# Patient Record
Sex: MALE | Race: BLACK OR AFRICAN AMERICAN | Employment: PART TIME | URBAN - METROPOLITAN AREA
[De-identification: names, ages, dates, MRNs, and addresses within clinical notes are randomized per-mention and may not be internally consistent; named-entity substitution may affect disease eponyms.]

---

## 2019-05-10 ENCOUNTER — PATIENT OUTREACH (OUTPATIENT)
Dept: CASE MANAGEMENT | Age: 54
End: 2019-05-10

## 2019-05-10 NOTE — PROGRESS NOTES
5/10/19 saw pt today with Dr. 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 He is currently a pt of Dr. Rakesh hCávez. Written information provided to the pt and this was also reviewed in the room. Provided opportunity to ask questions and all were discussed. My contact information was provided and I encouraged him to call with any questions or concerns. We still need to check eligibility with pathology report review and [employer] scan. The team will reach out to pt with updates and schedule. Navigation will continue to follow.

## 2019-06-03 PROBLEM — C7B.02 SECONDARY CARCINOID TUMORS OF LIVER (HCC): Status: ACTIVE | Noted: 2019-05-10

## 2019-06-03 PROBLEM — C7A.8 PRIMARY MALIGNANT NEUROENDOCRINE TUMOR OF PANCREAS (HCC): Status: ACTIVE | Noted: 2019-05-10

## 2019-06-03 PROBLEM — C7B.03: Status: ACTIVE | Noted: 2019-05-10

## 2019-07-16 ENCOUNTER — HOSPITAL ENCOUNTER (OUTPATIENT)
Dept: LAB | Age: 54
Discharge: HOME | End: 2019-07-16
Payer: MEDICARE

## 2019-07-16 DIAGNOSIS — C7B.03 SECONDARY CARCINOID TUMORS OF BONE (HCC): ICD-10-CM

## 2019-07-16 DIAGNOSIS — C7A.8 PRIMARY MALIGNANT NEUROENDOCRINE TUMOR OF PANCREAS (HCC): ICD-10-CM

## 2019-07-16 DIAGNOSIS — C7B.02 SECONDARY CARCINOID TUMORS OF LIVER (HCC): ICD-10-CM

## 2019-07-16 DIAGNOSIS — Z01.812 PRE-PROCEDURE LAB EXAM: ICD-10-CM

## 2019-07-16 LAB
ALBUMIN SERPL-MCNC: 3.2 G/DL (ref 3.5–5)
ALBUMIN/GLOB SERPL: 0.7 {RATIO} (ref 1.2–3.5)
ALP SERPL-CCNC: 399 U/L (ref 50–136)
ALT SERPL-CCNC: 15 U/L (ref 12–65)
ANION GAP SERPL CALC-SCNC: 12 MMOL/L (ref 7–16)
AST SERPL-CCNC: 13 U/L (ref 15–37)
BASOPHILS # BLD: 0 K/UL (ref 0–0.2)
BASOPHILS NFR BLD: 0 % (ref 0–2)
BILIRUB SERPL-MCNC: 0.3 MG/DL (ref 0.2–1.1)
BUN SERPL-MCNC: 37 MG/DL (ref 6–23)
CALCIUM SERPL-MCNC: 9.3 MG/DL (ref 8.3–10.4)
CHLORIDE SERPL-SCNC: 96 MMOL/L (ref 98–107)
CO2 SERPL-SCNC: 21 MMOL/L (ref 21–32)
CREAT SERPL-MCNC: 1.99 MG/DL (ref 0.8–1.5)
DIFFERENTIAL METHOD BLD: ABNORMAL
EOSINOPHIL # BLD: 0 K/UL (ref 0–0.8)
EOSINOPHIL NFR BLD: 0 % (ref 0.5–7.8)
ERYTHROCYTE [DISTWIDTH] IN BLOOD BY AUTOMATED COUNT: 15 % (ref 11.9–14.6)
GLOBULIN SER CALC-MCNC: 4.4 G/DL (ref 2.3–3.5)
GLUCOSE SERPL-MCNC: 408 MG/DL (ref 65–100)
HCT VFR BLD AUTO: 28 % (ref 41.1–50.3)
HGB BLD-MCNC: 9.3 G/DL (ref 13.6–17.2)
IMM GRANULOCYTES # BLD AUTO: 0 K/UL (ref 0–0.5)
IMM GRANULOCYTES NFR BLD AUTO: 0 % (ref 0–5)
LYMPHOCYTES # BLD: 0.2 K/UL (ref 0.5–4.6)
LYMPHOCYTES NFR BLD: 3 % (ref 13–44)
MCH RBC QN AUTO: 27 PG (ref 26.1–32.9)
MCHC RBC AUTO-ENTMCNC: 33.2 G/DL (ref 31.4–35)
MCV RBC AUTO: 81.4 FL (ref 79.6–97.8)
MONOCYTES # BLD: 0.2 K/UL (ref 0.1–1.3)
MONOCYTES NFR BLD: 3 % (ref 4–12)
NEUTS SEG # BLD: 6.8 K/UL (ref 1.7–8.2)
NEUTS SEG NFR BLD: 94 % (ref 43–78)
NRBC # BLD: 0 K/UL (ref 0–0.2)
PLATELET # BLD AUTO: 242 K/UL (ref 150–450)
PMV BLD AUTO: 9.2 FL (ref 9.4–12.3)
POTASSIUM SERPL-SCNC: 5.6 MMOL/L (ref 3.5–5.1)
PROT SERPL-MCNC: 7.6 G/DL (ref 6.3–8.2)
RBC # BLD AUTO: 3.44 M/UL (ref 4.23–5.67)
SODIUM SERPL-SCNC: 129 MMOL/L (ref 136–145)
WBC # BLD AUTO: 7.2 K/UL (ref 4.3–11.1)

## 2019-07-16 PROCEDURE — 80053 COMPREHEN METABOLIC PANEL: CPT

## 2019-07-16 PROCEDURE — 36415 COLL VENOUS BLD VENIPUNCTURE: CPT

## 2019-07-16 PROCEDURE — 86316 IMMUNOASSAY TUMOR OTHER: CPT

## 2019-07-16 PROCEDURE — 85025 COMPLETE CBC W/AUTO DIFF WBC: CPT

## 2019-07-17 LAB — CGA SERPL-SCNC: 13 NMOL/L (ref 0–5)

## 2019-07-18 ENCOUNTER — HOSPITAL ENCOUNTER (OUTPATIENT)
Dept: NUCLEAR MEDICINE | Age: 54
Discharge: HOME | End: 2019-07-18
Payer: MEDICARE

## 2019-07-18 ENCOUNTER — HOSPITAL ENCOUNTER (OUTPATIENT)
Dept: INFUSION THERAPY | Age: 54
Discharge: HOME | End: 2019-07-18
Payer: MEDICARE

## 2019-07-18 VITALS
DIASTOLIC BLOOD PRESSURE: 62 MMHG | RESPIRATION RATE: 16 BRPM | TEMPERATURE: 98.6 F | BODY MASS INDEX: 21.32 KG/M2 | WEIGHT: 140.2 LBS | OXYGEN SATURATION: 99 % | HEART RATE: 59 BPM | SYSTOLIC BLOOD PRESSURE: 118 MMHG

## 2019-07-18 DIAGNOSIS — C7B.03 SECONDARY CARCINOID TUMORS OF BONE (HCC): ICD-10-CM

## 2019-07-18 DIAGNOSIS — C7A.8 PRIMARY MALIGNANT NEUROENDOCRINE TUMOR OF PANCREAS (HCC): Primary | ICD-10-CM

## 2019-07-18 DIAGNOSIS — C7B.02 SECONDARY CARCINOID TUMORS OF LIVER (HCC): ICD-10-CM

## 2019-07-18 DIAGNOSIS — C7A.8 PRIMARY MALIGNANT NEUROENDOCRINE TUMOR OF PANCREAS (HCC): ICD-10-CM

## 2019-07-18 LAB
ALBUMIN SERPL-MCNC: 3.2 G/DL (ref 3.5–5)
ALBUMIN/GLOB SERPL: 0.8 {RATIO} (ref 1.2–3.5)
ALP SERPL-CCNC: 347 U/L (ref 50–136)
ALT SERPL-CCNC: 18 U/L (ref 12–65)
ANION GAP SERPL CALC-SCNC: 12 MMOL/L (ref 7–16)
AST SERPL-CCNC: 9 U/L (ref 15–37)
BILIRUB SERPL-MCNC: 0.3 MG/DL (ref 0.2–1.1)
BUN SERPL-MCNC: 30 MG/DL (ref 6–23)
CALCIUM SERPL-MCNC: 9 MG/DL (ref 8.3–10.4)
CHLORIDE SERPL-SCNC: 100 MMOL/L (ref 98–107)
CO2 SERPL-SCNC: 18 MMOL/L (ref 21–32)
CREAT SERPL-MCNC: 1.83 MG/DL (ref 0.8–1.5)
GLOBULIN SER CALC-MCNC: 4 G/DL (ref 2.3–3.5)
GLUCOSE SERPL-MCNC: 392 MG/DL (ref 65–100)
POTASSIUM SERPL-SCNC: 4.3 MMOL/L (ref 3.5–5.1)
PROT SERPL-MCNC: 7.2 G/DL (ref 6.3–8.2)
SODIUM SERPL-SCNC: 130 MMOL/L (ref 136–145)

## 2019-07-18 PROCEDURE — 96367 TX/PROPH/DG ADDL SEQ IV INF: CPT

## 2019-07-18 PROCEDURE — 79101 NUCLEAR RX IV ADMIN: CPT

## 2019-07-18 PROCEDURE — 96365 THER/PROPH/DIAG IV INF INIT: CPT

## 2019-07-18 PROCEDURE — 74011000258 HC RX REV CODE- 258: Performed by: INTERNAL MEDICINE

## 2019-07-18 PROCEDURE — 74011000258 HC RX REV CODE- 258

## 2019-07-18 PROCEDURE — 74011250636 HC RX REV CODE- 250/636: Performed by: INTERNAL MEDICINE

## 2019-07-18 PROCEDURE — 74011000250 HC RX REV CODE- 250: Performed by: INTERNAL MEDICINE

## 2019-07-18 PROCEDURE — A9513 LUTETIUM LU 177 DOTATAT THER: HCPCS | Performed by: INTERNAL MEDICINE

## 2019-07-18 PROCEDURE — 80053 COMPREHEN METABOLIC PANEL: CPT

## 2019-07-18 PROCEDURE — 96375 TX/PRO/DX INJ NEW DRUG ADDON: CPT

## 2019-07-18 PROCEDURE — 96366 THER/PROPH/DIAG IV INF ADDON: CPT

## 2019-07-18 RX ORDER — PALONOSETRON 0.05 MG/ML
0.25 INJECTION, SOLUTION INTRAVENOUS ONCE
Status: COMPLETED | OUTPATIENT
Start: 2019-07-18 | End: 2019-07-18

## 2019-07-18 RX ORDER — MAG HYDROX/ALUMINUM HYD/SIMETH 200-200-20
30 SUSPENSION, ORAL (FINAL DOSE FORM) ORAL
Status: DISPENSED | OUTPATIENT
Start: 2019-07-18 | End: 2019-07-18

## 2019-07-18 RX ORDER — SODIUM CHLORIDE 0.9 % (FLUSH) 0.9 %
10 SYRINGE (ML) INJECTION AS NEEDED
Status: ACTIVE | OUTPATIENT
Start: 2019-07-18 | End: 2019-07-18

## 2019-07-18 RX ORDER — DEXAMETHASONE SODIUM PHOSPHATE 100 MG/10ML
10 INJECTION INTRAMUSCULAR; INTRAVENOUS ONCE
Status: COMPLETED | OUTPATIENT
Start: 2019-07-18 | End: 2019-07-18

## 2019-07-18 RX ORDER — SODIUM CHLORIDE 9 MG/ML
25 INJECTION, SOLUTION INTRAVENOUS CONTINUOUS
Status: ACTIVE | OUTPATIENT
Start: 2019-07-18 | End: 2019-07-18

## 2019-07-18 RX ADMIN — Medication 10 ML: at 08:30

## 2019-07-18 RX ADMIN — PALONOSETRON 0.25 MG: 0.05 INJECTION, SOLUTION INTRAVENOUS at 09:40

## 2019-07-18 RX ADMIN — SODIUM CHLORIDE 25 ML/HR: 900 INJECTION, SOLUTION INTRAVENOUS at 10:45

## 2019-07-18 RX ADMIN — FAMOTIDINE 20 MG: 10 INJECTION, SOLUTION INTRAVENOUS at 09:28

## 2019-07-18 RX ADMIN — LUTETIUM LU 177 DOTATATE 200 MILLICURIE: 10 INJECTION INTRAVENOUS at 11:30

## 2019-07-18 RX ADMIN — DEXAMETHASONE SODIUM PHOSPHATE 10 MG: 10 INJECTION INTRAMUSCULAR; INTRAVENOUS at 09:37

## 2019-07-18 RX ADMIN — SODIUM CHLORIDE 150 MG: 900 INJECTION, SOLUTION INTRAVENOUS at 09:42

## 2019-07-18 RX ADMIN — Medication 10 ML: at 15:40

## 2019-07-18 RX ADMIN — ISOLEUCINE, LEUCINE, LYSINE ACETATE, METHIONINE, PHENYLALANINE, THREONINE, TRYPTOPHAN, VALINE, CYSTEINE HYDROCHLORIDE, HISTIDINE, TYROSINE, N-ACETYL-TYROSINE, ALANINE, ARGININE, PROLINE, SERINE, GLYCINE, ASPARTIC ACID, GLUTAMIC ACID, AND TAURINE 250 G
.82; 1.4; 1.2; .34; .48; .42; .2; .78; .024; .48; .044; .24; .54; 1.2; .68; .38; .36; .32; .5; .025 SOLUTION INTRAVENOUS at 10:40

## 2019-07-18 RX ADMIN — SODIUM CHLORIDE 1000 ML: 900 INJECTION, SOLUTION INTRAVENOUS at 08:40

## 2019-07-18 NOTE — PROGRESS NOTES
Trophamine complete,voiding frequently,  Denies n/v.  Nuc. Med in to scan room.  Room cleared   Discharge instructions reviewed with patient and girlfriend by Nas Acevedo RN  Discharged home, Next appt w/ Dr Antwan Read 7/19  With Dr Giselle Ashraf 8/19

## 2019-07-18 NOTE — PROGRESS NOTES
Arrived to infusion for D1 Magdya  Reviewed procedure , questions answered. CMP redrawn , Cr 1.8, K + 4.2 , results called to Vega Lind RN. OK to proceed per Dr Remy\  CrCL 44  NS bolus infused, all premeds given. Trophamine started 300 min prior to [address] [facility], [id number].  Tolerating well

## 2019-07-21 ENCOUNTER — PATIENT OUTREACH (OUTPATIENT)
Dept: CASE MANAGEMENT | Age: 54
End: 2019-07-21

## 2019-07-22 NOTE — PROGRESS NOTES
Reason for Referral: Malignant carcinoid tumors of other sites     Referring Provider: Antonia Fernández MD     Primary Care Provider: Aruna Lim MD     Family History of Cancer/Hematologic Disorders:  None reported     Presenting Symptoms: Severe back pain, elevated chromogranin A, and abnormal CT scans/MRI       Narrative with recent with Results/Procedures/Biopsies and Dates completed:  Mr. Kraig Mayer is a 48year old black male with a history of HTN, DM2, bradycardia, gout, insomnia, anal fissurectomy in 2011, gastrojejunostomy, and jejunostomy feeding tube placement in 2012. He was generally healthy until developing abdominal discomfort and anorexia in the spring and summer of 2010. He initially had symptomatic treatment for what was thought to be a gastric ulcer. However, symptoms worsened, and CT in the ER identified an abdominal mass. On 2/14/2011 the abdominal mass was biopsied revealing a neuroendocrine carcinoma. His chromogranin A was elevated at 26, and his symptoms included a weight loss of 80 pounds. 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 He established care with Dr. Ann-Marie Sheets at [facility] on 3/18/11, and was initiated on Everolimus plus Beviciumab on CALGB 79046, Arm B (which included Sandostatin  LAR) with good response to therapy. In April of 2012, disease progressed with liver metastasis, and patient was switched to cisplatin and etoposide with excellent response documented on CT scan after 2 cycles. By August of 2012, patient had completed 6 cycles of cisplatin and etoposide.      In October of 2012, patient was started on 37.5 mg Sunitinib daily, with excellent disease control as evidenced by CT scan in July of 2013. Sunitinib 37.5 mg daily was continued, and patient continued to do well with excellent disease control by history, physical exam, and CT scan in October of 2013.  It was not until March of 2014 that patient reported new symptoms of worsening abdominal pain and significant weight loss. CT scan of the abdomen and chromogranin A were reordered (chromogranin A was 88 on 11/11/2013; 112 on 12/11/2013; and 102 on 01/06/2014). CT scan in April of 2014 showed progression of disease with increased size of abdominal masses, and labs revealed a rise in chromogranin A from 100 to 150. Patient was subsequently started on Capecitabine 2500 mg daily in divided doses days 1 through 14, Temozolomide 400 mg daily in divided doses days 10 through 14, and Granisetron 2 mg daily days 10 through 14 of a 28-day cycle. On 2/3/15, Capecitabine was discontinued and patient remained only on Temozolamide.      In December of 2015, patient continued to do well on Temozolomide taken days #10 through #14 each month with no signs or symptoms of progression. He continued with the same regimen and monthly Sandostatin. MD noted that patient felt so well that he stopped taking his Temozolomide for 2 months in the summer of 2016; however, despite this he showed no signs of progression and the April 2016 CT scan looked better than the August 2015 scan. He resumed taking his Temozolomide on 7/25/16.     He continued doing well with no signs of disease progression until January of 2018 when he reported anorexia and unintentional weight loss. Restaging scans on 1/24/18, however, were stable. A1C was 11.8 and weight loss was thought to be due to poor diabetes control. In June of 2018, he began experiencing worsening right abdominal pain and CT scan of the chest/abd/pelvis was ordered with 3 phase pancreatic protocol for evaluation of pain and restaging.  CT scan on 6/20/18 demonstrated a stable appearance of dominant retroperitoneal mass associated with the tail the pancreas with occlusion of the splenic vein with extensive perigastric collaterals; however, question early infiltration of the contiguous posteromedial margin of the spleen which may have been present on the 01/24/2018 scan but was not evident on the 11/28/2017 scan; no new evidence of pulmonary or hepatic metastatic disease; and a stable appearance of the pelvis.      Patient continued on Sandostatin and Temozolomide with Norco given for abdominal pain. CT scans of the chest/abdomen/pelvis on 1/10/19 were stable, however, patient became increasingly symptomatic with severe back pain. Temozolomide was stopped on 1/16/19 and interventional pain management and radiation oncology were consulted.      CT scans stable abdomen tumor on Temozolomide alone, but he is increasingly symptomatic with severe back pain, the worst that he has had in 9 years. Stop the Temozolomide. Consult interventional pain management. Consult radiation oncology. Set up a CT scan guided biopsy to send tissue for [facility]. Consider a new line of systemic therapy or even going back to cis-platinum/etoposide. On 1/28/19, patient underwent CT guided core biopsy of the retroperitoneal mass and tissue was sent for [facility]. By the time of his radiation therapy consultation on 1/30/19, patients pain had resolved and he did not require radiation therapy. On 1/31/19, Temozolomide was restarted.      On 4/1/19, patient underwent MRI of the lumbar spine with and without contrast that unfortunately identified widespread osseous metastatic disease, with metastases in the every vertebral segment. Chromogranin A, drawn on 4/2/19, was elevated to 241. CT of the chest, abdomen and pelvis on 4/16/19 revealed interval development of multifocal osseous metastases and liver metastases. The primary retroperitoneal mass was seen with a fairly similar configuration and dimension, again demonstrating encasement of multiple arteries (celiac and superior mesenteric and renal).     Sandostatin and Temozolomide were continued and patient was referred to [facility] for evaluation for Lutethera trial. Prior to initiation of Cheryl Julian,  Jose Sam referred patient again to radiation oncology for palliative radiation. Radiation oncologist, Dr. Gamal Dsouza, saw patient on 5/6/19 and agreed to a rapid course of 2000 cGy in 5 fractions over 1 week with a CT simulation scheduled for 5/8/2019.     PATHOLOGY EXAMINATION 2/10/2011       PATHOLOGY EXAMINATION 2/14/2011         PET CT 2/28/2011       BONE SCAN 3/2/2011  FINDINGS:        OCTREOTIDE SCAN 3/7/2011  FINDINGS:        CT OF THE CHEST, ABDOMEN AND PELVIS 6/17/2011           CT ABDOMEN AND PELVIS 7/21/11         CHEST, ABDOMEN, AND PELVIS CT WITH CONTRAST 9/9/2011         CT THORAX WITH CONTRAST 12/14/2011       CT THORAX W/CONTRAST 2/23/2012           CT OF THE CHEST, ABDOMEN AND PELVIS WITH ORAL AND IV CONTRAST 4/19/12           CT ABDOMEN AND CT PELVIS WITH ORAL AND IV CONTRAST 6/4/2012  FINDINGS:          CT CHEST, CT ABDOMEN, CT PELVIS WITH ORAL AND IV CONTRAST 8/15/12             CT ABDOMEN, CT PELVIS WITH ORAL AND IV CONTRAST 11/8/2012  FINDINGS:          CT ABDOMEN AND PELVIS 3/6/2013  FINDINGS:          PANCREATIC PROTOCOL CT ABDOMEN 5/30/2013         CT OF THE ABDOMEN 10/11/2013  FINDINGS:          CT ABD & PELVIS W/CONTRAST 4/30/14       CT ABD & PELVIS W/CONTRAST 10/16/2014          CT OF THE ABDOMEN AND PELVIS 8/21/2015  FINDINGS: The lung bases are unremarkable. The liver is normal in size, and is normal in attenuation. The gallbladder is unremarkable. There is no evidence of dilated ducts. The spleen is normal in size. I believe there are several areas of subtle low attenuation involving the periphery of the spleen in addition there is a more central hypoechoic area which measures approximately 13 mm maximal diameter. While the exact etiology of these is uncertain, strictly speaking, I would not be of exclude metastatic disease to the spleen.  The pancreas is not well delineated. There is abnormal soft tissue density in the expected location of the distal body/tail of the pancreas. This poorly defined soft tissue density measures approximately 5.7 cm x 4.2 cm, therefore may be slightly larger than was seen on the prior study there is loss of the normal fat planes between this mass and the proximal SMA and celiac axis. There are numerous portosystemic collaterals noted. The splenic vein is not visualized in its entirety. There may well be a occlusion of the splenic vein. The abdominal aorta is normal incaliber. No adrenal mass is identified. The kidneys unremarkable. I believe the patient has had prior bowel surgery. It appears AS findings consistent with that of a gastrojejunostomy. Images of the pelvis show no evidence of a pelvic mass. There is no evidence of ascites. IMPRESSION:                                                             1. I SEE LITTLE INTERVAL CHANGE IN THE APPEARANCE OF THE PATIENT'S   KNOWN RETROPERITONEAL MASS. WHEN COMPARED WITH PRIOR [address] IT IS  DIFFICULT TO GIVE AN EXACT MEASUREMENTS, THE PATIENT'S KNOWN MASS MAY   HAVE INCREASED SLIGHTLY IN SIZE WHEN COMPARED WITH PRIOR STUDY. 2. 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 3. THERE ARE SEVERAL AREAS OF ABNORMAL LOW ATTENUATION NOTED IN THE PERIPHERY OF THE SPLEEN AS WELL AS A SMALLER MORE CENTRAL 13 MM LESION IN THE CENTRAL ASPECT OF THE SPLEEN. THE EXACT ETIOLOGY OF THESE FINDINGS IS UNCERTAIN. ONE WOULD NOT, HOWEVER, BE ABLE TO EXCLUDE THE POSSIBILITY OF METASTATIC DISEASE                                               CT ABDOMEN PELVIS W WO CONTRAST 4/19/2016  ABDOMINAL CT SCAN FINDINGS: A 49 mm x 46 mm mass is present in the upper left retroperitoneum (sequence 701, image 34) previously measuring 57 mm x 43 mm. The mass demonstrates stippled dystrophic central calcification and demonstrates cicatrization and retraction of the adjacent retroperitoneal fat.  The mass is infiltrating the adipose tissue about the left hemidiaphragm crura. The mass encases the splenic artery and the common hepatic artery. It has resulted in occlusion of the splenic vein, superior mesenteric vein and portal vein confluence. Multiple dilated portal venous collaterals are demonstrated reconstituting the portal vein in the swati hepatis. Overall the mass is grossly stable if not smaller (exact measurements are difficult given the infiltrating nature of the mass). The liver, gallbladder and imaged pancreas remain normal. The adrenals and kidneys are normal. The spleen is normal. Prior gastric bypass, there is no evidence of bowel obstruction. The lung bases are clear. There is no pneumoperitoneum or evidence of bowel obstruction  PELVIC CT SCAN FINDINGS: The bladder is moderately distended. There is no free fluid in the pelvis. There is no evidence of bowel obstruction. IMPRESSION:  1. Persistent mass in the left retroperitoneum stable since August 2015, possibly slightly smaller. 2.  The mass demonstrates infiltrating margins into the retroperitoneal fat and has resulted in encasement of the splenic artery and proximal common hepatic artery as well as occlusion of the portal vein confluence with the superior mesenteric vein and splenic vein. 3. Currently, no evidence of hepatic, splenic or adrenal metastatic disease.     CT ABDOMEN PELVIS W WO CONTRAST 2/28/2017  ABDOMINAL CT SCAN FINDINGS: Again demonstrated in the retroperitoneum is a infiltrating soft tissue mass measuring approximately 48 mm x 45 mm (Grossly unchanged). There is stippled calcification within the central mass. Again demonstrated is infiltration of the retroperitoneal soft tissues secondary the tumor and there is vascular encasement of the splenic artery, branches of the superior mesenteric artery and occlusion of the splenic vein. There is a somewhat stellate appearance to the mass with mesenteric retraction.  The splenic vein and proximal superior mesenteric vein are obstructed with numerous portal venous collaterals recanalized the portal venous flow in the upper abdomen. No hepatic metastatic lesions are demonstrated. The left adrenal is engulfed by the mass. Right adrenal is grossly normal. The pancreas is obscured by the mass. The spleen and kidneys are normal. The lung bases are clear. There is no evidence of bowel obstruction. PELVIC CT SCAN FINDINGS:  The GI tract in the pelvis is normal. There is no pathologic adenopathy or free fluid. IMPRESSION:  1. The central carcinoid mass in the left retroperitoneum is grossly stable in size but there is continued cicatrization and retraction of the mesentery and soft tissues at the level of the mass. Marked paucity of retroperitoneal fat makes demonstration of the mass margins less distinct. 2.  Vascular encasement of the splenic artery and branches superior mesenteric artery. 3.  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     CT ABDOMEN PELVIS W CONTRAST 11/28/2017  FINDINGS: A tiny subpleural density of the right lower lobe seen on image 17 is unchanged. The lung base parenchyma remains clear. The partially calcified retroperitoneal mass is seen as before.  Subjectively the mass appears a little larger. Attempting to measure at the same level and in the same manner as previous I achieve biaxial dimensions of about 56 x 54 mm which compares to previous measurements of 40 x 45 mm. As before there is encasement of branches of the splenic and superior mesenteric arteries. Note is also again made of splenic vein occlusion with numerous portasystemic collaterals in the upper abdomen. As before orally the pancreatic head is confidently identify with inhomogeneous enhancement. The mass presumably involves the left adrenal gland as well. The right adrenal gland appears within normal limits. There are no new liver lesions evident. The spleen enhances homogeneously.  The kidneys enhance symmetrically and normally. No unusual bowel loops are evident on today's exam. I do not appreciate any new inflammatory changes. There are no new free or focal fluid collections evident. There is no pathologic lymphadenopathy or new suspicious soft tissue lesion. The prostate gland and unopacified urinary bladder are grossly unremarkable in appearance. Finally, there are no focal bone lesions. IMPRESSION:  1.  THE RETROPERITONEAL MASS APPEARS SLIGHTLY LARGER THAN BEFORE.  HOWEVER, THERE ARE NO OTHER INTERVAL CHANGES EVIDENT.     CHEST/ABDOMEN/PELVIS CT SCAN WITH CONTRAST 1/24/2018  CHEST CT FINDINGS: The mediastinum and mediastinal structures remain normal. The lungs are clear without metastatic disease. Several very small micronodules are noted, these will likely ultimately be benign intrapulmonary lymph nodes.  The soft tissues a chest are normal       ABDOMEN CT FINDINGS: Partially calcified left upper retroperitoneal mass remains present measuring approximately 6 cm x 5.4 cm (previously measuring 5.6 cm x 5.4 cm. There are regions of necrosis as well as dystrophic calcifications. The mass is seen to occlude the splenic vein and there are numerous upper abdominal portal venous collaterals reconstituting splenic venous flow to the portal vein. The liver remains normal without metastatic lesion. The gallbladder and biliary tree are normal. The spleen is normal. The right adrenal is normal. The left adrenal is engulfed by the mass. Both kidneys are grossly normal. The GI tract demonstrates no evidence of bowel obstruction. The stomach is compressed by the mass. Multiple gastric venous channels are also open reconstitute antegrade splenic vein flow. There is no ascites. There is induration of the millicent-vascular structures in the upper retroperitoneum similar to the prior examination. However, no arterial occlusions are present.  Prior primary bowel reanastomosis in the upper abdomen  PELVIS CT FINDINGS: The pelvis is normal.  RESULTS:   1. Large left retroperitoneal mass slightly enlarged interval but otherwise unchanged  2. Persistent splenic vein occlusion with numerous well developed collaterals reconstituting antegrade splenic flow to the main portal vein  3. No distant metastatic disease.     CHEST/ABDOMEN/PELVIS CT SCAN WITH CONTRAST 6/20/18  FINDINGS:  THORACIC CT: Lungs remain clear. No evidence of pulmonary metastases or infiltrates. No new mediastinal or hilar adenopathy. No pleural or pericardial effusion. No new chest wall lesions/osseous abnormalities. ABDOMINAL CT:  LIVER: No evidence of hepatic metastases. Gallbladder is normal in appearance. No new biliary dilatation. SPLEEN: Decreased attenuation at the point of contact with the pancreatic mass, potentially reflecting localized invasion but stable since 01/24/2018. Evidence of splenic vein occlusion with extensive perigastric collaterals and multiple collaterals extending into the swati hepatis. Distortion but not occlusion of the splenic artery. PANCREAS: Persistent mixed attenuation mass in the retroperitoneum around the region of the tail the pancreas measuring at least 5.9 x 5.4 cm using the same landmarks that were utilized previously. No significant change in the interval.  KIDNEYS: Symmetric enhancement without obstruction. No distended large or small bowel loops. PELVIC CT: No evidence of colon wall thickening or pericolonic inflammatory changes. No evidence of bowel obstruction. No new pelvic adenopathy or ascites. No new pelvic bone lesions. Likely etiology of patient's left lower quadrant pain not identified.   IMPRESSION:   1. Stable appearance of dominant retroperitoneal mass associated with the tail the pancreas with occlusion of the splenic vein with extensive perigastric collaterals.    2. However, question early infiltration of the contiguous posteromedial margin of the spleen. This may have been present on 01/24/2080 but was not evident 11/28/2017.  3. No new evidence of pulmonary or hepatic metastatic disease. 4. Stable appearance of pelvis.     CHEST CT 01/10/2019  FINDINGS: Lungs appear clear. No pulmonary nodules are detected. There is no mediastinal lymphadenopathy. Coronary artery calcifications are present. Chest wall demonstrates increased breast soft tissue density suggesting gynecomastia. There is extensive portasystemic collaterals in the upper abdomen. Mass of the pancreas is evaluated on abdominal CT with separate report. IMPRESSION: NO EVIDENCE OF METASTATIC DISEASE IN THE CHEST. THERE IS EVIDENCE OF CORONARY ARTERY CALCIFICATIONS.     CT ABDOMEN PELVIS PANCREAS PROTOCOL WITHOUT AND WITH IV CONTRAST 01/10/2019  FINDINGS: Examination redemonstrates extensive upper abdominal portal systemic collateral vessels (perigastric collaterals, periportal and gastrohepatic ligament and esophageal varices). High-grade narrowing superior mesenteric vein and occlusion splenic vein are again demonstrated. The poorly defined heterogeneous mass in the left retroperitoneal measures approximately 5.1 x 6.6 cm (5.4 x 5.9 cm previously). Visually, this does not appear obviously changed. Liver appears normal. Spleen demonstrates stripes of diminished attenuation/enhancement which are thought to relate to splenic architecture and contrast timing and not pathology. The gallbladder appears normal. No biliary ductal dilatation. Left adrenal is not visible and is thought to be involved by the mass. Right adrenal appears normal. Kidneys appear normal. Pelvis demonstrates no free fluid or evidence of adenopathy. Prostate is not enlarged.  Urinary bladder appears normal.  IMPRESSION: FINDINGS AS ABOVE WITH NO MAJOR CHANGES COMPARED TO PREVIOUS EXAM.     PATHOLOGY EXAMINATION 1/28/2019       MRI LUMBAR SPINE W WO CONTRAST 4/1/2019  FINDINGS:  Comparison:  CT chest abdomen and pelvis 01/10/2019   PROBLEM SPECIFIC FINDINGS:   - widespread osseous malignancy is identified, with T1 hypointense/STIR hyperintense, heterogeneously but aggressively enhancing metastases are seen involving virtually every vertebral segment on this exam.  - I see no definite evidence for extraosseous extension of tumor, nor do I see any pathologic compression fracture at this time. ADDITIONAL FINDINGS: Accelerate degenerative disc disease is seen at L5-S1 manifest as loss of normal water signal and intervertebral disc space height with endplate changes and marginal osteophytes. Facet relationships are normally maintained. T12-L1: No significant abnormality. L1-L2: No significant abnormality. L2-L3: No significant abnormality. L3-L4: No significant abnormality. L4-L5: Mild intraforaminal bulging of the disc annulus without neural displacement  L5-S1: Mild posterior and intraforaminal bulging of the disc annulus without neural displacement  I see no signal nor enhancing abnormalities in the visualized lower conus medullaris. IMPRESSION:   1. Widespread osseous metastatic disease, with metastases in the every vertebral segment. 2. I see no spinal stenosis nor definite neural displacement. 3. These findings were verbally communicated (10:53) Zoltan Preston: called to [facility], for Dr. Sophia Cho at 10:48 a.m.     CT CHEST ABDOMEN PELVIS W CONTRAST 4/16/2019  FINDINGS:   CHEST: The mediastinum shows no adenopathy in the heart size and pericardial thickness are normal. Coronary calcified plaque is less conspicuous on today's exam which has generous contrast bifurcation of the arteries. I find no evidence of hilar adenopathy on either side. No pleural abnormalities have developed. A review of lung window settings on the MIP images shows no opacity in either lung to suggest metastatic lesion.  Bone window settings show a new zone of hyperdensity in the left hemivertebra at T3 measuring 15 mm and another in the right hemivertebra at T5 measuring 17 mm posterior elements and no hyperdense at T7 and there is a dense lesion in the left hemivertebra of T8 measuring 14 mm. The right transverse process and lamina at T9 shows worsened density. All of the lumbar vertebral segments show zones of new or worsened density abnormality. Scattered zones of dense abnormality are more conspicuous in the iliac bones and sacrum and right more than left pubic bones. ABDOMEN: New zones of density abnormality demonstrated in both right and left lobes of the liver including some right under the dome of the diaphragm. Many of these have ring enhancing appearance. The largest appears to be in the right lobe which on image 92 of sequence 201 measures 21 x 33 mm. The spleen again shows band-like enhancement, more likely related to bolus injection and acquisition timing than pathology. Upper abdominal mesenteric soft tissues show no pathologic bowel distention or fluid collection. Retroperitoneal/periaortic mass is again demonstrated including some calcifications. In January the dimensions were 51 x 66 mm. Today I measure on image 95 of sequence 201 and get dimensions of 59 x 61 mm. Side by side comparison shows fairly stable configuration and dimension on adjacent slices. Today's study has more dense arterial enhancement and shows to better advantage encasement of the celiac and superior mesenteric trunks as well as both renal arteries. The left adrenal gland is again difficult to identify and may be incorporated into the lesion. The right is not changed. I see no focal renal lesion or urinary tract obstruction on either side. No new retroperitoneal adenopathy is demonstrated. PELVIS: I do not see any iliac or inguinal adenopathy on either side. There is no pathologic bowel distention or fluid collection. The prostate has stable appearance in the urinary bladder is unremarkable. IMPRESSION: Interval study showing development of multifocal osseous metastases and liver metastases.  The primary retroperitoneal mass has fairly similar configuration and dimension, again demonstrating encasement of multiple arteries (celiac and superior mesenteric and renal).                             NotReason for Referral: Malignant carcinoid tumors of other sites     Referring Provider: Judi Mckinney MD     Primary Care Provider: Lynda Brooke MD     Family History of Cancer/Hematologic Disorders:  None reported     Presenting Symptoms: Severe back pain, elevated chromogranin A, and abnormal CT scans/MRI       Narrative with recent with Results/Procedures/Biopsies and Dates completed:  Mr. Maye Stewart is a 48year old black male with a history of HTN, DM2, bradycardia, gout, insomnia, anal fissurectomy in 2011, gastrojejunostomy, and jejunostomy feeding tube placement in 2012. He was generally healthy until developing abdominal discomfort and anorexia in the spring and summer of 2010. He initially had symptomatic treatment for what was thought to be a gastric ulcer. However, symptoms worsened, and CT in the ER identified an abdominal mass. On 2/14/2011 the abdominal mass was biopsied revealing a neuroendocrine carcinoma. His chromogranin A was elevated at 26, and his symptoms included a weight loss of 80 pounds. 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 He established care with Dr. Flori Boone at [facility] on 3/18/11, and was initiated on Everolimus plus Beviciumab on CALGB 79408, Arm B (which included Sandostatin  LAR) with good response to therapy. In April of 2012, disease progressed with liver metastasis, and patient was switched to cisplatin and etoposide with excellent response documented on CT scan after 2 cycles. By August of 2012, patient had completed 6 cycles of cisplatin and etoposide.      In October of 2012, patient was started on 37.5 mg Sunitinib daily, with excellent disease control as evidenced by CT scan in July of 2013.  Sunitinib 37.5 mg daily was continued, and patient continued to do well with excellent disease control by history, physical exam, and CT scan in October of 2013. It was not until March of 2014 that patient reported new symptoms of worsening abdominal pain and significant weight loss. CT scan of the abdomen and chromogranin A were reordered (chromogranin A was 88 on 11/11/2013; 112 on 12/11/2013; and 102 on 01/06/2014). CT scan in April of 2014 showed progression of disease with increased size of abdominal masses, and labs revealed a rise in chromogranin A from 100 to 150. Patient was subsequently started on Capecitabine 2500 mg daily in divided doses days 1 through 14, Temozolomide 400 mg daily in divided doses days 10 through 14, and Granisetron 2 mg daily days 10 through 14 of a 28-day cycle. On 2/3/15, Capecitabine was discontinued and patient remained only on Temozolamide.      In December of 2015, patient continued to do well on Temozolomide taken days #10 through #14 each month with no signs or symptoms of progression. He continued with the same regimen and monthly Sandostatin. MD noted that patient felt so well that he stopped taking his Temozolomide for 2 months in the summer of 2016; however, despite this he showed no signs of progression and the April 2016 CT scan looked better than the August 2015 scan. He resumed taking his Temozolomide on 7/25/16.     He continued doing well with no signs of disease progression until January of 2018 when he reported anorexia and unintentional weight loss. Restaging scans on 1/24/18, however, were stable. A1C was 11.8 and weight loss was thought to be due to poor diabetes control. In June of 2018, he began experiencing worsening right abdominal pain and CT scan of the chest/abd/pelvis was ordered with 3 phase pancreatic protocol for evaluation of pain and restaging.  CT scan on 6/20/18 demonstrated a stable appearance of dominant retroperitoneal mass associated with the tail the pancreas with occlusion of the splenic vein with extensive perigastric collaterals; however, question early infiltration of the contiguous posteromedial margin of the spleen which may have been present on the 01/24/2018 scan but was not evident on the 11/28/2017 scan; no new evidence of pulmonary or hepatic metastatic disease; and a stable appearance of the pelvis.      Patient continued on Sandostatin and Temozolomide with Norco given for abdominal pain. CT scans of the chest/abdomen/pelvis on 1/10/19 were stable, however, patient became increasingly symptomatic with severe back pain. Temozolomide was stopped on 1/16/19 and interventional pain management and radiation oncology were consulted.      CT scans stable abdomen tumor on Temozolomide alone, but he is increasingly symptomatic with severe back pain, the worst that he has had in 9 years. Stop the Temozolomide. Consult interventional pain management. Consult radiation oncology. Set up a CT scan guided biopsy to send tissue for [facility]. Consider a new line of systemic therapy or even going back to cis-platinum/etoposide. On 1/28/19, patient underwent CT guided core biopsy of the retroperitoneal mass and tissue was sent for [facility]. By the time of his radiation therapy consultation on 1/30/19, patients pain had resolved and he did not require radiation therapy. On 1/31/19, Temozolomide was restarted.      On 4/1/19, patient underwent MRI of the lumbar spine with and without contrast that unfortunately identified widespread osseous metastatic disease, with metastases in the every vertebral segment. Chromogranin A, drawn on 4/2/19, was elevated to 241. CT of the chest, abdomen and pelvis on 4/16/19 revealed interval development of multifocal osseous metastases and liver metastases.  The primary retroperitoneal mass was seen with a fairly similar configuration and dimension, again demonstrating encasement of multiple arteries (celiac and superior mesenteric and renal). Sandostatin and Temozolomide were continued and patient was referred to [facility] for evaluation for Lutethera trial. Prior to initiation of Vinie Arrington, Dr. Jerri Moss referred patient again to radiation oncology for palliative radiation. Radiation oncologist, Dr. Colonel Shah, saw patient on 5/6/19 and agreed to a rapid course of 2000 cGy in 5 fractions over 1 week with a CT simulation scheduled for 5/8/2019.     PATHOLOGY EXAMINATION 2/10/2011       PATHOLOGY EXAMINATION 2/14/2011         PET CT 2/28/2011       BONE SCAN 3/2/2011  FINDINGS:        OCTREOTIDE SCAN 3/7/2011  FINDINGS:        CT OF THE CHEST, ABDOMEN AND PELVIS 6/17/2011           CT ABDOMEN AND PELVIS 7/21/11         CHEST, ABDOMEN, AND PELVIS CT WITH CONTRAST 9/9/2011         CT THORAX WITH CONTRAST 12/14/2011       CT THORAX W/CONTRAST 2/23/2012           CT OF THE CHEST, ABDOMEN AND PELVIS WITH ORAL AND IV CONTRAST 4/19/12           CT ABDOMEN AND CT PELVIS WITH ORAL AND IV CONTRAST 6/4/2012  FINDINGS:          CT CHEST, CT ABDOMEN, CT PELVIS WITH ORAL AND IV CONTRAST 8/15/12             CT ABDOMEN, CT PELVIS WITH ORAL AND IV CONTRAST 11/8/2012  FINDINGS:          CT ABDOMEN AND PELVIS 3/6/2013  FINDINGS:          PANCREATIC PROTOCOL CT ABDOMEN 5/30/2013         CT OF THE ABDOMEN 10/11/2013  FINDINGS:          CT ABD & PELVIS W/CONTRAST 4/30/14       CT ABD & PELVIS W/CONTRAST 10/16/2014          CT OF THE ABDOMEN AND PELVIS 8/21/2015  FINDINGS: The lung bases are unremarkable.  The liver is normal in size, and is normal in attenuation. The gallbladder is unremarkable. There is no evidence of dilated ducts. The spleen is normal in size. I believe there are several areas of subtle low attenuation involving the periphery of the spleen in addition there is a more central hypoechoic area which measures approximately 13 mm maximal diameter. While the exact etiology of these is uncertain, strictly speaking, I would not be of exclude metastatic disease to the spleen. The pancreas is not well delineated. There is abnormal soft tissue density in the expected location of the distal body/tail of the pancreas. This poorly defined soft tissue density measures approximately 5.7 cm x 4.2 cm, therefore may be slightly larger than was seen on the prior study there is loss of the normal fat planes between this mass and the proximal SMA and celiac axis. There are numerous portosystemic collaterals noted. The splenic vein is not visualized in its entirety. There may well be a occlusion of the splenic vein. The abdominal aorta is normal incaliber. No adrenal mass is identified. The kidneys unremarkable. I believe the patient has had prior bowel surgery. It appears AS findings consistent with that of a gastrojejunostomy. Images of the pelvis show no evidence of a pelvic mass. There is no evidence of ascites. IMPRESSION:                                                             1. I SEE LITTLE INTERVAL CHANGE IN THE APPEARANCE OF THE PATIENT'S   KNOWN RETROPERITONEAL MASS. WHEN COMPARED WITH PRIOR [address] IT IS  DIFFICULT TO GIVE AN EXACT MEASUREMENTS, THE PATIENT'S KNOWN MASS MAY   HAVE INCREASED SLIGHTLY IN SIZE WHEN COMPARED WITH PRIOR STUDY. 2. 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 3. THERE ARE SEVERAL AREAS OF ABNORMAL LOW ATTENUATION NOTED IN THE PERIPHERY OF THE SPLEEN AS WELL AS A SMALLER MORE CENTRAL 13 MM LESION IN THE CENTRAL ASPECT OF THE SPLEEN. THE EXACT ETIOLOGY OF THESE FINDINGS IS UNCERTAIN. ONE WOULD NOT, HOWEVER, BE ABLE TO EXCLUDE THE POSSIBILITY OF METASTATIC DISEASE                                               CT ABDOMEN PELVIS W WO CONTRAST 4/19/2016  ABDOMINAL CT SCAN FINDINGS: A 49 mm x 46 mm mass is present in the upper left retroperitoneum (sequence 701, image 34) previously measuring 57 mm x 43 mm. The mass demonstrates stippled dystrophic central calcification and demonstrates cicatrization and retraction of the adjacent retroperitoneal fat. The mass is infiltrating the adipose tissue about the left hemidiaphragm crura. The mass encases the splenic artery and the common hepatic artery. It has resulted in occlusion of the splenic vein, superior mesenteric vein and portal vein confluence. Multiple dilated portal venous collaterals are demonstrated reconstituting the portal vein in the swati hepatis. Overall the mass is grossly stable if not smaller (exact measurements are difficult given the infiltrating nature of the mass). The liver, gallbladder and imaged pancreas remain normal. The adrenals and kidneys are normal. The spleen is normal. Prior gastric bypass, there is no evidence of bowel obstruction. The lung bases are clear. There is no pneumoperitoneum or evidence of bowel obstruction  PELVIC CT SCAN FINDINGS: The bladder is moderately distended. There is no free fluid in the pelvis. There is no evidence of bowel obstruction. IMPRESSION:  1. Persistent mass in the left retroperitoneum stable since August 2015, possibly slightly smaller. 2.  The mass demonstrates infiltrating margins into the retroperitoneal fat and has resulted in encasement of the splenic artery and proximal common hepatic artery as well as occlusion of the portal vein confluence with the superior mesenteric vein and splenic vein. 3. Currently, no evidence of hepatic, splenic or adrenal metastatic disease.     CT ABDOMEN PELVIS W WO CONTRAST 2/28/2017  ABDOMINAL CT SCAN FINDINGS: Again demonstrated in the retroperitoneum is a infiltrating soft tissue mass measuring approximately 48 mm x 45 mm (Grossly unchanged). There is stippled calcification within the central mass.  Again demonstrated is infiltration of the retroperitoneal soft tissues secondary the tumor and there is vascular encasement of the splenic artery, branches of the superior mesenteric artery and occlusion of the splenic vein. There is a somewhat stellate appearance to the mass with mesenteric retraction. The splenic vein and proximal superior mesenteric vein are obstructed with numerous portal venous collaterals recanalized the portal venous flow in the upper abdomen. No hepatic metastatic lesions are demonstrated. The left adrenal is engulfed by the mass. Right adrenal is grossly normal. The pancreas is obscured by the mass. The spleen and kidneys are normal. The lung bases are clear. There is no evidence of bowel obstruction. PELVIC CT SCAN FINDINGS:  The GI tract in the pelvis is normal. There is no pathologic adenopathy or free fluid. IMPRESSION:  1. The central carcinoid mass in the left retroperitoneum is grossly stable in size but there is continued cicatrization and retraction of the mesentery and soft tissues at the level of the mass. Marked paucity of retroperitoneal fat makes demonstration of the mass margins less distinct. 2.  Vascular encasement of the splenic artery and branches superior mesenteric artery. 3.  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     CT ABDOMEN PELVIS W CONTRAST 11/28/2017  FINDINGS: A tiny subpleural density of the right lower lobe seen on image 17 is unchanged. The lung base parenchyma remains clear. The partially calcified retroperitoneal mass is seen as before.  Subjectively the mass appears a little larger. Attempting to measure at the same level and in the same manner as previous I achieve biaxial dimensions of about 56 x 54 mm which compares to previous measurements of 40 x 45 mm. As before there is encasement of branches of the splenic and superior mesenteric arteries. Note is also again made of splenic vein occlusion with numerous portasystemic collaterals in the upper abdomen.  As before orally the pancreatic head is confidently identify with inhomogeneous enhancement. The mass presumably involves the left adrenal gland as well. The right adrenal gland appears within normal limits. There are no new liver lesions evident. The spleen enhances homogeneously. The kidneys enhance symmetrically and normally. No unusual bowel loops are evident on today's exam. I do not appreciate any new inflammatory changes. There are no new free or focal fluid collections evident. There is no pathologic lymphadenopathy or new suspicious soft tissue lesion. The prostate gland and unopacified urinary bladder are grossly unremarkable in appearance. Finally, there are no focal bone lesions. IMPRESSION:  1.  THE RETROPERITONEAL MASS APPEARS SLIGHTLY LARGER THAN BEFORE.  HOWEVER, THERE ARE NO OTHER INTERVAL CHANGES EVIDENT.     CHEST/ABDOMEN/PELVIS CT SCAN WITH CONTRAST 1/24/2018  CHEST CT FINDINGS: The mediastinum and mediastinal structures remain normal. The lungs are clear without metastatic disease. Several very small micronodules are noted, these will likely ultimately be benign intrapulmonary lymph nodes.  The soft tissues a chest are normal       ABDOMEN CT FINDINGS: Partially calcified left upper retroperitoneal mass remains present measuring approximately 6 cm x 5.4 cm (previously measuring 5.6 cm x 5.4 cm. There are regions of necrosis as well as dystrophic calcifications. The mass is seen to occlude the splenic vein and there are numerous upper abdominal portal venous collaterals reconstituting splenic venous flow to the portal vein.  The liver remains normal without metastatic lesion. The gallbladder and biliary tree are normal. The spleen is normal. The right adrenal is normal. The left adrenal is engulfed by the mass. Both kidneys are grossly normal. The GI tract demonstrates no evidence of bowel obstruction. The stomach is compressed by the mass. Multiple gastric venous channels are also open reconstitute antegrade splenic vein flow. There is no ascites. There is induration of the millicent-vascular structures in the upper retroperitoneum similar to the prior examination. However, no arterial occlusions are present. Prior primary bowel reanastomosis in the upper abdomen  PELVIS CT FINDINGS: The pelvis is normal.  RESULTS:   1. Large left retroperitoneal mass slightly enlarged interval but otherwise unchanged  2. Persistent splenic vein occlusion with numerous well developed collaterals reconstituting antegrade splenic flow to the main portal vein  3. No distant metastatic disease.     CHEST/ABDOMEN/PELVIS CT SCAN WITH CONTRAST 6/20/18  FINDINGS:  THORACIC CT: Lungs remain clear. No evidence of pulmonary metastases or infiltrates. No new mediastinal or hilar adenopathy. No pleural or pericardial effusion. No new chest wall lesions/osseous abnormalities. ABDOMINAL CT:  LIVER: No evidence of hepatic metastases. Gallbladder is normal in appearance. No new biliary dilatation. SPLEEN: Decreased attenuation at the point of contact with the pancreatic mass, potentially reflecting localized invasion but stable since 01/24/2018. Evidence of splenic vein occlusion with extensive perigastric collaterals and multiple collaterals extending into the swati hepatis. Distortion but not occlusion of the splenic artery. PANCREAS: Persistent mixed attenuation mass in the retroperitoneum around the region of the tail the pancreas measuring at least 5.9 x 5.4 cm using the same landmarks that were utilized previously. No significant change in the interval.  KIDNEYS: Symmetric enhancement without obstruction. No distended large or small bowel loops. PELVIC CT: No evidence of colon wall thickening or pericolonic inflammatory changes. No evidence of bowel obstruction. No new pelvic adenopathy or ascites. No new pelvic bone lesions. Likely etiology of patient's left lower quadrant pain not identified.   IMPRESSION:   1. Stable appearance of dominant retroperitoneal mass associated with the tail the pancreas with occlusion of the splenic vein with extensive perigastric collaterals.    2. However, question early infiltration of the contiguous posteromedial margin of the spleen. This may have been present on 01/24/2080 but was not evident 11/28/2017.  3. No new evidence of pulmonary or hepatic metastatic disease. 4. Stable appearance of pelvis.     CHEST CT 01/10/2019  FINDINGS: Lungs appear clear. No pulmonary nodules are detected. There is no mediastinal lymphadenopathy. Coronary artery calcifications are present. Chest wall demonstrates increased breast soft tissue density suggesting gynecomastia. There is extensive portasystemic collaterals in the upper abdomen. Mass of the pancreas is evaluated on abdominal CT with separate report. IMPRESSION: NO EVIDENCE OF METASTATIC DISEASE IN THE CHEST. THERE IS EVIDENCE OF CORONARY ARTERY CALCIFICATIONS.     CT ABDOMEN PELVIS PANCREAS PROTOCOL WITHOUT AND WITH IV CONTRAST 01/10/2019  FINDINGS: Examination redemonstrates extensive upper abdominal portal systemic collateral vessels (perigastric collaterals, periportal and gastrohepatic ligament and esophageal varices). High-grade narrowing superior mesenteric vein and occlusion splenic vein are again demonstrated. The poorly defined heterogeneous mass in the left retroperitoneal measures approximately 5.1 x 6.6 cm (5.4 x 5.9 cm previously). Visually, this does not appear obviously changed. Liver appears normal. Spleen demonstrates stripes of diminished attenuation/enhancement which are thought to relate to splenic architecture and contrast timing and not pathology. The gallbladder appears normal. No biliary ductal dilatation. Left adrenal is not visible and is thought to be involved by the mass. Right adrenal appears normal. Kidneys appear normal. Pelvis demonstrates no free fluid or evidence of adenopathy. Prostate is not enlarged.  Urinary bladder appears normal.  IMPRESSION: FINDINGS AS ABOVE WITH NO MAJOR CHANGES COMPARED TO PREVIOUS EXAM.     PATHOLOGY EXAMINATION 1/28/2019       MRI LUMBAR SPINE W WO CONTRAST 4/1/2019  FINDINGS:  Comparison:  CT chest abdomen and pelvis 01/10/2019   PROBLEM SPECIFIC FINDINGS:   - widespread osseous malignancy is identified, with T1 hypointense/STIR hyperintense, heterogeneously but aggressively enhancing metastases are seen involving virtually every vertebral segment on this exam.  - I see no definite evidence for extraosseous extension of tumor, nor do I see any pathologic compression fracture at this time. ADDITIONAL FINDINGS: Accelerate degenerative disc disease is seen at L5-S1 manifest as loss of normal water signal and intervertebral disc space height with endplate changes and marginal osteophytes. Facet relationships are normally maintained. T12-L1: No significant abnormality. L1-L2: No significant abnormality. L2-L3: No significant abnormality. L3-L4: No significant abnormality. L4-L5: Mild intraforaminal bulging of the disc annulus without neural displacement  L5-S1: Mild posterior and intraforaminal bulging of the disc annulus without neural displacement  I see no signal nor enhancing abnormalities in the visualized lower conus medullaris. IMPRESSION:   1. Widespread osseous metastatic disease, with metastases in the every vertebral segment. 2. I see no spinal stenosis nor definite neural displacement. 3. These findings were verbally communicated (10:53) Conner Juarez: called to [facility], for Dr. Nancy Jennings at 10:48 a.m.     CT CHEST ABDOMEN PELVIS W CONTRAST 4/16/2019  FINDINGS:   CHEST: The mediastinum shows no adenopathy in the heart size and pericardial thickness are normal. Coronary calcified plaque is less conspicuous on today's exam which has generous contrast bifurcation of the arteries. I find no evidence of hilar adenopathy on either side. No pleural abnormalities have developed. A review of lung window settings on the MIP images shows no opacity in either lung to suggest metastatic lesion.  Bone window settings show a new zone of hyperdensity in the left hemivertebra at T3 measuring 15 mm and another in the right hemivertebra at T5 measuring 17 mm posterior elements and no hyperdense at T7 and there is a dense lesion in the left hemivertebra of T8 measuring 14 mm. The right transverse process and lamina at T9 shows worsened density. All of the lumbar vertebral segments show zones of new or worsened density abnormality. Scattered zones of dense abnormality are more conspicuous in the iliac bones and sacrum and right more than left pubic bones. ABDOMEN: New zones of density abnormality demonstrated in both right and left lobes of the liver including some right under the dome of the diaphragm. Many of these have ring enhancing appearance. The largest appears to be in the right lobe which on image 92 of sequence 201 measures 21 x 33 mm. The spleen again shows band-like enhancement, more likely related to bolus injection and acquisition timing than pathology. Upper abdominal mesenteric soft tissues show no pathologic bowel distention or fluid collection. Retroperitoneal/periaortic mass is again demonstrated including some calcifications. In January the dimensions were 51 x 66 mm. Today I measure on image 95 of sequence 201 and get dimensions of 59 x 61 mm. Side by side comparison shows fairly stable configuration and dimension on adjacent slices. Today's study has more dense arterial enhancement and shows to better advantage encasement of the celiac and superior mesenteric trunks as well as both renal arteries. The left adrenal gland is again difficult to identify and may be incorporated into the lesion. The right is not changed. I see no focal renal lesion or urinary tract obstruction on either side. No new retroperitoneal adenopathy is demonstrated.   PELVIS: I do not see any iliac or inguinal adenopathy on either side. There is no pathologic bowel distention or fluid collection. The prostate has stable appearance in the urinary bladder is unremarkable. IMPRESSION: Interval study showing development of multifocal osseous metastases and liver metastases. The primary retroperitoneal mass has fairly similar configuration and dimension, again demonstrating encasement of multiple arteries (celiac and superior mesenteric and renal).              Reason for Referral: Malignant carcinoid tumors of other sites     Referring Provider: Sigifredo Vallejo MD     Primary Care Provider: Manny De La Rosa MD     Family History of Cancer/Hematologic Disorders:  None reported     Presenting Symptoms: Severe back pain, elevated chromogranin A, and abnormal CT scans/MRI       Narrative with recent with Results/Procedures/Biopsies and Dates completed:  Mr. Sirisha Musa is a 48year old black male with a history of HTN, DM2, bradycardia, gout, insomnia, anal fissurectomy in 2011, gastrojejunostomy, and jejunostomy feeding tube placement in 2012. He was generally healthy until developing abdominal discomfort and anorexia in the spring and summer of 2010. He initially had symptomatic treatment for what was thought to be a gastric ulcer. However, symptoms worsened, and CT in the ER identified an abdominal mass. On 2/14/2011 the abdominal mass was biopsied revealing a neuroendocrine carcinoma. His chromogranin A was elevated at 26, and his symptoms included a weight loss of 80 pounds. 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 He established care with Dr. Isobel Bamberger at [facility] on 3/18/11, and was initiated on Everolimus plus Beviciumab on CALGB 01197, Arm B (which included Sandostatin  LAR) with good response to therapy. In April of 2012, disease progressed with liver metastasis, and patient was switched to cisplatin and etoposide with excellent response documented on CT scan after 2 cycles.  By August of 2012, patient had completed 6 cycles of cisplatin and etoposide.      In October of 2012, patient was started on 37.5 mg Sunitinib daily, with excellent disease control as evidenced by CT scan in July of 2013. Sunitinib 37.5 mg daily was continued, and patient continued to do well with excellent disease control by history, physical exam, and CT scan in October of 2013. It was not until March of 2014 that patient reported new symptoms of worsening abdominal pain and significant weight loss. CT scan of the abdomen and chromogranin A were reordered (chromogranin A was 88 on 11/11/2013; 112 on 12/11/2013; and 102 on 01/06/2014). CT scan in April of 2014 showed progression of disease with increased size of abdominal masses, and labs revealed a rise in chromogranin A from 100 to 150. Patient was subsequently started on Capecitabine 2500 mg daily in divided doses days 1 through 14, Temozolomide 400 mg daily in divided doses days 10 through 14, and Granisetron 2 mg daily days 10 through 14 of a 28-day cycle. On 2/3/15, Capecitabine was discontinued and patient remained only on Temozolamide.      In December of 2015, patient continued to do well on Temozolomide taken days #10 through #14 each month with no signs or symptoms of progression. He continued with the same regimen and monthly Sandostatin. MD noted that patient felt so well that he stopped taking his Temozolomide for 2 months in the summer of 2016; however, despite this he showed no signs of progression and the April 2016 CT scan looked better than the August 2015 scan. He resumed taking his Temozolomide on 7/25/16.     He continued doing well with no signs of disease progression until January of 2018 when he reported anorexia and unintentional weight loss. Restaging scans on 1/24/18, however, were stable. A1C was 11.8 and weight loss was thought to be due to poor diabetes control.   In June of 2018, he began experiencing worsening right abdominal pain and CT scan of the chest/abd/pelvis was ordered with 3 phase pancreatic protocol for evaluation of pain and restaging. CT scan on 6/20/18 demonstrated a stable appearance of dominant retroperitoneal mass associated with the tail the pancreas with occlusion of the splenic vein with extensive perigastric collaterals; however, question early infiltration of the contiguous posteromedial margin of the spleen which may have been present on the 01/24/2018 scan but was not evident on the 11/28/2017 scan; no new evidence of pulmonary or hepatic metastatic disease; and a stable appearance of the pelvis.      Patient continued on Sandostatin and Temozolomide with Norco given for abdominal pain. CT scans of the chest/abdomen/pelvis on 1/10/19 were stable, however, patient became increasingly symptomatic with severe back pain. Temozolomide was stopped on 1/16/19 and interventional pain management and radiation oncology were consulted.      CT scans stable abdomen tumor on Temozolomide alone, but he is increasingly symptomatic with severe back pain, the worst that he has had in 9 years. Stop the Temozolomide. Consult interventional pain management. Consult radiation oncology. Set up a CT scan guided biopsy to send tissue for [facility]. Consider a new line of systemic therapy or even going back to cis-platinum/etoposide. On 1/28/19, patient underwent CT guided core biopsy of the retroperitoneal mass and tissue was sent for [facility]. By the time of his radiation therapy consultation on 1/30/19, patients pain had resolved and he did not require radiation therapy. On 1/31/19, Temozolomide was restarted.      On 4/1/19, patient underwent MRI of the lumbar spine with and without contrast that unfortunately identified widespread osseous metastatic disease, with metastases in the every vertebral segment. Chromogranin A, drawn on 4/2/19, was elevated to 241.  CT of the chest, abdomen and pelvis on 4/16/19 revealed interval development of multifocal osseous metastases and liver metastases. The primary retroperitoneal mass was seen with a fairly similar configuration and dimension, again demonstrating encasement of multiple arteries (celiac and superior mesenteric and renal). Sandostatin and Temozolomide were continued and patient was referred to [facility] for evaluation for Lutethera trial. Prior to initiation of [place] [place], Dr. Madeleine Frye referred patient again to radiation oncology for palliative radiation. Radiation oncologist, Dr. Nader Richardson, saw patient on 5/6/19 and agreed to a rapid course of 2000 cGy in 5 fractions over 1 week with a CT simulation scheduled for 5/8/2019.     PATHOLOGY EXAMINATION 2/10/2011       PATHOLOGY EXAMINATION 2/14/2011         PET CT 2/28/2011       BONE SCAN 3/2/2011  FINDINGS:        OCTREOTIDE SCAN 3/7/2011  FINDINGS:        CT OF THE CHEST, ABDOMEN AND PELVIS 6/17/2011           CT ABDOMEN AND PELVIS 7/21/11         CHEST, ABDOMEN, AND PELVIS CT WITH CONTRAST 9/9/2011         CT THORAX WITH CONTRAST 12/14/2011       CT THORAX W/CONTRAST 2/23/2012           CT OF THE CHEST, ABDOMEN AND PELVIS WITH ORAL AND IV CONTRAST 4/19/12           CT ABDOMEN AND CT PELVIS WITH ORAL AND IV CONTRAST 6/4/2012  FINDINGS:          CT CHEST, CT ABDOMEN, CT PELVIS WITH ORAL AND IV CONTRAST 8/15/12             CT ABDOMEN, CT PELVIS WITH ORAL AND IV CONTRAST 11/8/2012  FINDINGS:          CT ABDOMEN AND PELVIS 3/6/2013  FINDINGS:          PANCREATIC PROTOCOL CT ABDOMEN 5/30/2013         CT OF THE ABDOMEN 10/11/2013  FINDINGS:          CT ABD & PELVIS W/CONTRAST 4/30/14       CT ABD & PELVIS W/CONTRAST 10/16/2014          CT OF THE ABDOMEN AND PELVIS 8/21/2015  FINDINGS: The lung bases are unremarkable.  The liver is normal in size, and is normal in attenuation. The gallbladder is unremarkable. There is no evidence of dilated ducts. The spleen is normal in size. I believe there are several areas of subtle low attenuation involving the periphery of the spleen in addition there is a more central hypoechoic area which measures approximately 13 mm maximal diameter. While the exact etiology of these is uncertain, strictly speaking, I would not be of exclude metastatic disease to the spleen. The pancreas is not well delineated. There is abnormal soft tissue density in the expected location of the distal body/tail of the pancreas. This poorly defined soft tissue density measures approximately 5.7 cm x 4.2 cm, therefore may be slightly larger than was seen on the prior study there is loss of the normal fat planes between this mass and the proximal SMA and celiac axis. There are numerous portosystemic collaterals noted. The splenic vein is not visualized in its entirety. There may well be a occlusion of the splenic vein. The abdominal aorta is normal incaliber. No adrenal mass is identified. The kidneys unremarkable. I believe the patient has had prior bowel surgery. It appears AS findings consistent with that of a gastrojejunostomy. Images of the pelvis show no evidence of a pelvic mass. There is no evidence of ascites. IMPRESSION:                                                             1. I SEE LITTLE INTERVAL CHANGE IN THE APPEARANCE OF THE PATIENT'S   KNOWN RETROPERITONEAL MASS. WHEN COMPARED WITH PRIOR [address] IT IS  DIFFICULT TO GIVE AN EXACT MEASUREMENTS, THE PATIENT'S KNOWN MASS MAY   HAVE INCREASED SLIGHTLY IN SIZE WHEN COMPARED WITH PRIOR STUDY. 2. 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 3. THERE ARE SEVERAL AREAS OF ABNORMAL LOW ATTENUATION NOTED IN THE PERIPHERY OF THE SPLEEN AS WELL AS A SMALLER MORE CENTRAL 13 MM LESION IN THE CENTRAL ASPECT OF THE SPLEEN. THE EXACT ETIOLOGY OF THESE FINDINGS IS UNCERTAIN. ONE WOULD NOT, HOWEVER, BE ABLE TO EXCLUDE THE POSSIBILITY OF METASTATIC DISEASE                                               CT ABDOMEN PELVIS W WO CONTRAST 4/19/2016  ABDOMINAL CT SCAN FINDINGS: A 49 mm x 46 mm mass is present in the upper left retroperitoneum (sequence 701, image 34) previously measuring 57 mm x 43 mm. The mass demonstrates stippled dystrophic central calcification and demonstrates cicatrization and retraction of the adjacent retroperitoneal fat. The mass is infiltrating the adipose tissue about the left hemidiaphragm crura. The mass encases the splenic artery and the common hepatic artery. It has resulted in occlusion of the splenic vein, superior mesenteric vein and portal vein confluence. Multiple dilated portal venous collaterals are demonstrated reconstituting the portal vein in the swati hepatis. Overall the mass is grossly stable if not smaller (exact measurements are difficult given the infiltrating nature of the mass). The liver, gallbladder and imaged pancreas remain normal. The adrenals and kidneys are normal. The spleen is normal. Prior gastric bypass, there is no evidence of bowel obstruction. The lung bases are clear. There is no pneumoperitoneum or evidence of bowel obstruction  PELVIC CT SCAN FINDINGS: The bladder is moderately distended. There is no free fluid in the pelvis. There is no evidence of bowel obstruction. IMPRESSION:  1. Persistent mass in the left retroperitoneum stable since August 2015, possibly slightly smaller. 2.  The mass demonstrates infiltrating margins into the retroperitoneal fat and has resulted in encasement of the splenic artery and proximal common hepatic artery as well as occlusion of the portal vein confluence with the superior mesenteric vein and splenic vein. 3. Currently, no evidence of hepatic, splenic or adrenal metastatic disease.     CT ABDOMEN PELVIS W WO CONTRAST 2/28/2017  ABDOMINAL CT SCAN FINDINGS: Again demonstrated in the retroperitoneum is a infiltrating soft tissue mass measuring approximately 48 mm x 45 mm (Grossly unchanged). There is stippled calcification within the central mass.  Again demonstrated is infiltration of the retroperitoneal soft tissues secondary the tumor and there is vascular encasement of the splenic artery, branches of the superior mesenteric artery and occlusion of the splenic vein. There is a somewhat stellate appearance to the mass with mesenteric retraction. The splenic vein and proximal superior mesenteric vein are obstructed with numerous portal venous collaterals recanalized the portal venous flow in the upper abdomen. No hepatic metastatic lesions are demonstrated. The left adrenal is engulfed by the mass. Right adrenal is grossly normal. The pancreas is obscured by the mass. The spleen and kidneys are normal. The lung bases are clear. There is no evidence of bowel obstruction. PELVIC CT SCAN FINDINGS:  The GI tract in the pelvis is normal. There is no pathologic adenopathy or free fluid. IMPRESSION:  1. The central carcinoid mass in the left retroperitoneum is grossly stable in size but there is continued cicatrization and retraction of the mesentery and soft tissues at the level of the mass. Marked paucity of retroperitoneal fat makes demonstration of the mass margins less distinct. 2.  Vascular encasement of the splenic artery and branches superior mesenteric artery. 3.  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     CT ABDOMEN PELVIS W CONTRAST 11/28/2017  FINDINGS: A tiny subpleural density of the right lower lobe seen on image 17 is unchanged. The lung base parenchyma remains clear. The partially calcified retroperitoneal mass is seen as before.  Subjectively the mass appears a little larger. Attempting to measure at the same level and in the same manner as previous I achieve biaxial dimensions of about 56 x 54 mm which compares to previous measurements of 40 x 45 mm. As before there is encasement of branches of the splenic and superior mesenteric arteries.  Note is also again made of splenic vein occlusion with numerous portasystemic collaterals in the upper abdomen. As before orally the pancreatic head is confidently identify with inhomogeneous enhancement. The mass presumably involves the left adrenal gland as well. The right adrenal gland appears within normal limits. There are no new liver lesions evident. The spleen enhances homogeneously. The kidneys enhance symmetrically and normally. No unusual bowel loops are evident on today's exam. I do not appreciate any new inflammatory changes. There are no new free or focal fluid collections evident. There is no pathologic lymphadenopathy or new suspicious soft tissue lesion. The prostate gland and unopacified urinary bladder are grossly unremarkable in appearance. Finally, there are no focal bone lesions. IMPRESSION:  1.  THE RETROPERITONEAL MASS APPEARS SLIGHTLY LARGER THAN BEFORE.  HOWEVER, THERE ARE NO OTHER INTERVAL CHANGES EVIDENT.     CHEST/ABDOMEN/PELVIS CT SCAN WITH CONTRAST 1/24/2018  CHEST CT FINDINGS: The mediastinum and mediastinal structures remain normal. The lungs are clear without metastatic disease. Several very small micronodules are noted, these will likely ultimately be benign intrapulmonary lymph nodes.  The soft tissues a chest are normal       ABDOMEN CT FINDINGS: Partially calcified left upper retroperitoneal mass remains present measuring approximately 6 cm x 5.4 cm (previously measuring 5.6 cm x 5.4 cm. There are regions of necrosis as well as dystrophic calcifications. The mass is seen to occlude the splenic vein and there are numerous upper abdominal portal venous collaterals reconstituting splenic venous flow to the portal vein.  The liver remains normal without metastatic lesion. The gallbladder and biliary tree are normal. The spleen is normal. The right adrenal is normal. The left adrenal is engulfed by the mass. Both kidneys are grossly normal. The GI tract demonstrates no evidence of bowel obstruction. The stomach is compressed by the mass. Multiple gastric venous channels are also open reconstitute antegrade splenic vein flow. There is no ascites. There is induration of the millicent-vascular structures in the upper retroperitoneum similar to the prior examination. However, no arterial occlusions are present. Prior primary bowel reanastomosis in the upper abdomen  PELVIS CT FINDINGS: The pelvis is normal.  RESULTS:   1. Large left retroperitoneal mass slightly enlarged interval but otherwise unchanged  2. Persistent splenic vein occlusion with numerous well developed collaterals reconstituting antegrade splenic flow to the main portal vein  3. No distant metastatic disease.     CHEST/ABDOMEN/PELVIS CT SCAN WITH CONTRAST 6/20/18  FINDINGS:  THORACIC CT: Lungs remain clear. No evidence of pulmonary metastases or infiltrates. No new mediastinal or hilar adenopathy. No pleural or pericardial effusion. No new chest wall lesions/osseous abnormalities. ABDOMINAL CT:  LIVER: No evidence of hepatic metastases. Gallbladder is normal in appearance. No new biliary dilatation. SPLEEN: Decreased attenuation at the point of contact with the pancreatic mass, potentially reflecting localized invasion but stable since 01/24/2018. Evidence of splenic vein occlusion with extensive perigastric collaterals and multiple collaterals extending into the swati hepatis. Distortion but not occlusion of the splenic artery. PANCREAS: Persistent mixed attenuation mass in the retroperitoneum around the region of the tail the pancreas measuring at least 5.9 x 5.4 cm using the same landmarks that were utilized previously. No significant change in the interval.  KIDNEYS: Symmetric enhancement without obstruction. No distended large or small bowel loops.   PELVIC CT: No evidence of colon wall thickening or pericolonic inflammatory changes. No evidence of bowel obstruction. No new pelvic adenopathy or ascites. No new pelvic bone lesions. Likely etiology of patient's left lower quadrant pain not identified. IMPRESSION:   1. Stable appearance of dominant retroperitoneal mass associated with the tail the pancreas with occlusion of the splenic vein with extensive perigastric collaterals.    2. However, question early infiltration of the contiguous posteromedial margin of the spleen. This may have been present on 01/24/2080 but was not evident 11/28/2017.  3. No new evidence of pulmonary or hepatic metastatic disease. 4. Stable appearance of pelvis.     CHEST CT 01/10/2019  FINDINGS: Lungs appear clear. No pulmonary nodules are detected. There is no mediastinal lymphadenopathy. Coronary artery calcifications are present. Chest wall demonstrates increased breast soft tissue density suggesting gynecomastia. There is extensive portasystemic collaterals in the upper abdomen. Mass of the pancreas is evaluated on abdominal CT with separate report. IMPRESSION: NO EVIDENCE OF METASTATIC DISEASE IN THE CHEST. THERE IS EVIDENCE OF CORONARY ARTERY CALCIFICATIONS.     CT ABDOMEN PELVIS PANCREAS PROTOCOL WITHOUT AND WITH IV CONTRAST 01/10/2019  FINDINGS: Examination redemonstrates extensive upper abdominal portal systemic collateral vessels (perigastric collaterals, periportal and gastrohepatic ligament and esophageal varices). High-grade narrowing superior mesenteric vein and occlusion splenic vein are again demonstrated. The poorly defined heterogeneous mass in the left retroperitoneal measures approximately 5.1 x 6.6 cm (5.4 x 5.9 cm previously). Visually, this does not appear obviously changed. Liver appears normal. Spleen demonstrates stripes of diminished attenuation/enhancement which are thought to relate to splenic architecture and contrast timing and not pathology. The gallbladder appears normal. No biliary ductal dilatation. Left adrenal is not visible and is thought to be involved by the mass.  Right adrenal appears normal. Kidneys appear normal. Pelvis demonstrates no free fluid or evidence of adenopathy. Prostate is not enlarged. Urinary bladder appears normal.  IMPRESSION: FINDINGS AS ABOVE WITH NO MAJOR CHANGES COMPARED TO PREVIOUS EXAM.     PATHOLOGY EXAMINATION 1/28/2019       MRI LUMBAR SPINE W WO CONTRAST 4/1/2019  FINDINGS:  Comparison:  CT chest abdomen and pelvis 01/10/2019   PROBLEM SPECIFIC FINDINGS:   - widespread osseous malignancy is identified, with T1 hypointense/STIR hyperintense, heterogeneously but aggressively enhancing metastases are seen involving virtually every vertebral segment on this exam.  - I see no definite evidence for extraosseous extension of tumor, nor do I see any pathologic compression fracture at this time. ADDITIONAL FINDINGS: Accelerate degenerative disc disease is seen at L5-S1 manifest as loss of normal water signal and intervertebral disc space height with endplate changes and marginal osteophytes. Facet relationships are normally maintained. T12-L1: No significant abnormality. L1-L2: No significant abnormality. L2-L3: No significant abnormality. L3-L4: No significant abnormality. L4-L5: Mild intraforaminal bulging of the disc annulus without neural displacement  L5-S1: Mild posterior and intraforaminal bulging of the disc annulus without neural displacement  I see no signal nor enhancing abnormalities in the visualized lower conus medullaris. IMPRESSION:   1. Widespread osseous metastatic disease, with metastases in the every vertebral segment. 2. I see no spinal stenosis nor definite neural displacement.    3. These findings were verbally communicated (10:53) Maritza Higgins: called to [facility], for Dr. Loc Mccoy at 10:48 a.m.     CT CHEST ABDOMEN PELVIS W CONTRAST 4/16/2019  FINDINGS:   CHEST: The mediastinum shows no adenopathy in the heart size and pericardial thickness are normal. Coronary calcified plaque is less conspicuous on today's exam which has generous contrast bifurcation of the arteries. I find no evidence of hilar adenopathy on either side. No pleural abnormalities have developed. A review of lung window settings on the MIP images shows no opacity in either lung to suggest metastatic lesion. Bone window settings show a new zone of hyperdensity in the left hemivertebra at T3 measuring 15 mm and another in the right hemivertebra at T5 measuring 17 mm posterior elements and no hyperdense at T7 and there is a dense lesion in the left hemivertebra of T8 measuring 14 mm. The right transverse process and lamina at T9 shows worsened density. All of the lumbar vertebral segments show zones of new or worsened density abnormality. Scattered zones of dense abnormality are more conspicuous in the iliac bones and sacrum and right more than left pubic bones. ABDOMEN: New zones of density abnormality demonstrated in both right and left lobes of the liver including some right under the dome of the diaphragm. Many of these have ring enhancing appearance. The largest appears to be in the right lobe which on image 92 of sequence 201 measures 21 x 33 mm. The spleen again shows band-like enhancement, more likely related to bolus injection and acquisition timing than pathology. Upper abdominal mesenteric soft tissues show no pathologic bowel distention or fluid collection. Retroperitoneal/periaortic mass is again demonstrated including some calcifications. In January the dimensions were 51 x 66 mm. Today I measure on image 95 of sequence 201 and get dimensions of 59 x 61 mm. Side by side comparison shows fairly stable configuration and dimension on adjacent slices. Today's study has more dense arterial enhancement and shows to better advantage encasement of the celiac and superior mesenteric trunks as well as both renal arteries.  The left adrenal gland is again difficult to identify and may be incorporated into the lesion. The right is not changed. I see no focal renal lesion or urinary tract obstruction on either side. No new retroperitoneal adenopathy is demonstrated. PELVIS: I do not see any iliac or inguinal adenopathy on either side. There is no pathologic bowel distention or fluid collection. The prostate has stable appearance in the urinary bladder is unremarkable. IMPRESSION: Interval study showing development of multifocal osseous metastases and liver metastases. The primary retroperitoneal mass has fairly similar configuration and dimension, again demonstrating encasement of multiple arteries (celiac and superior mesenteric and renal).                             Notes from Referring Provider:  None               Notes from Referring Provider:  Peter Dose from Referring Provider:  None    I met patient on 7-16-19 with Dr Davi Lucero prior to [facility] treatment this week. Paulette Freitas from research was here also and she explained the logistics of the procedure once again and pt feels he is ready for start. Pt was given my contact information and navigation was explained. Pt aware to arrive 0800 on 7-18-19 for first Lutathear infusion.

## 2019-08-27 ENCOUNTER — PATIENT OUTREACH (OUTPATIENT)
Dept: CASE MANAGEMENT | Age: 54
End: 2019-08-27

## 2019-08-27 ENCOUNTER — HOSPITAL ENCOUNTER (OUTPATIENT)
Dept: LAB | Age: 54
Discharge: HOME | End: 2019-08-27
Payer: MEDICARE

## 2019-08-27 DIAGNOSIS — C7A.8 PRIMARY MALIGNANT NEUROENDOCRINE TUMOR OF PANCREAS (HCC): ICD-10-CM

## 2019-08-27 LAB
ALBUMIN SERPL-MCNC: 3.2 G/DL (ref 3.5–5)
ALBUMIN/GLOB SERPL: 0.9 {RATIO} (ref 1.2–3.5)
ALP SERPL-CCNC: 223 U/L (ref 50–136)
ALT SERPL-CCNC: 17 U/L (ref 12–65)
ANION GAP SERPL CALC-SCNC: 8 MMOL/L (ref 7–16)
AST SERPL-CCNC: 11 U/L (ref 15–37)
BASOPHILS # BLD: 0 K/UL (ref 0–0.2)
BASOPHILS NFR BLD: 0 % (ref 0–2)
BILIRUB SERPL-MCNC: 0.4 MG/DL (ref 0.2–1.1)
BUN SERPL-MCNC: 7 MG/DL (ref 6–23)
CALCIUM SERPL-MCNC: 9.5 MG/DL (ref 8.3–10.4)
CHLORIDE SERPL-SCNC: 112 MMOL/L (ref 98–107)
CO2 SERPL-SCNC: 21 MMOL/L (ref 21–32)
CREAT SERPL-MCNC: 1.15 MG/DL (ref 0.8–1.5)
DIFFERENTIAL METHOD BLD: ABNORMAL
EOSINOPHIL # BLD: 0 K/UL (ref 0–0.8)
EOSINOPHIL NFR BLD: 1 % (ref 0.5–7.8)
ERYTHROCYTE [DISTWIDTH] IN BLOOD BY AUTOMATED COUNT: 17.2 % (ref 11.9–14.6)
GLOBULIN SER CALC-MCNC: 3.4 G/DL (ref 2.3–3.5)
GLUCOSE SERPL-MCNC: 203 MG/DL (ref 65–100)
HCT VFR BLD AUTO: 31 % (ref 41.1–50.3)
HGB BLD-MCNC: 10 G/DL (ref 13.6–17.2)
IMM GRANULOCYTES # BLD AUTO: 0 K/UL (ref 0–0.5)
IMM GRANULOCYTES NFR BLD AUTO: 0 % (ref 0–5)
LYMPHOCYTES # BLD: 0.3 K/UL (ref 0.5–4.6)
LYMPHOCYTES NFR BLD: 11 % (ref 13–44)
MCH RBC QN AUTO: 27.7 PG (ref 26.1–32.9)
MCHC RBC AUTO-ENTMCNC: 32.3 G/DL (ref 31.4–35)
MCV RBC AUTO: 85.9 FL (ref 79.6–97.8)
MONOCYTES # BLD: 0.4 K/UL (ref 0.1–1.3)
MONOCYTES NFR BLD: 13 % (ref 4–12)
NEUTS SEG # BLD: 2.2 K/UL (ref 1.7–8.2)
NEUTS SEG NFR BLD: 74 % (ref 43–78)
NRBC # BLD: 0 K/UL (ref 0–0.2)
PLATELET # BLD AUTO: 124 K/UL (ref 150–450)
PMV BLD AUTO: 9.6 FL (ref 9.4–12.3)
POTASSIUM SERPL-SCNC: 5 MMOL/L (ref 3.5–5.1)
PROT SERPL-MCNC: 6.6 G/DL (ref 6.3–8.2)
RBC # BLD AUTO: 3.61 M/UL (ref 4.23–5.67)
SODIUM SERPL-SCNC: 141 MMOL/L (ref 136–145)
WBC # BLD AUTO: 3 K/UL (ref 4.3–11.1)

## 2019-08-27 PROCEDURE — 36415 COLL VENOUS BLD VENIPUNCTURE: CPT

## 2019-08-27 PROCEDURE — 85025 COMPLETE CBC W/AUTO DIFF WBC: CPT

## 2019-08-27 PROCEDURE — 80053 COMPREHEN METABOLIC PANEL: CPT

## 2019-08-27 NOTE — PROGRESS NOTES
8/27/19 saw pt today with Dr. Atul Stone for tox check post cycle 1 lutathera. He is doing well. Denies any issues. PO intake is good. Discussed possible dose reducing for next cycle depending on labs. Plan for next cycle on 9/26 with OV 9/24. Encouraged to call with any concerns. Navigation will continue to follow.

## 2019-09-24 ENCOUNTER — HOSPITAL ENCOUNTER (OUTPATIENT)
Dept: LAB | Age: 54
Discharge: HOME | End: 2019-09-24
Payer: MEDICARE

## 2019-09-24 ENCOUNTER — PATIENT OUTREACH (OUTPATIENT)
Dept: CASE MANAGEMENT | Age: 54
End: 2019-09-24

## 2019-09-24 DIAGNOSIS — Z01.812 PRE-PROCEDURE LAB EXAM: ICD-10-CM

## 2019-09-24 DIAGNOSIS — C7B.02 SECONDARY CARCINOID TUMORS OF LIVER (HCC): ICD-10-CM

## 2019-09-24 DIAGNOSIS — C7B.03 SECONDARY CARCINOID TUMORS OF BONE (HCC): ICD-10-CM

## 2019-09-24 DIAGNOSIS — C7A.8 PRIMARY MALIGNANT NEUROENDOCRINE TUMOR OF PANCREAS (HCC): ICD-10-CM

## 2019-09-24 LAB
ALBUMIN SERPL-MCNC: 3.9 G/DL (ref 3.5–5)
ALBUMIN/GLOB SERPL: 1.1 {RATIO} (ref 1.2–3.5)
ALP SERPL-CCNC: 188 U/L (ref 50–136)
ALT SERPL-CCNC: 17 U/L (ref 12–65)
ANION GAP SERPL CALC-SCNC: 9 MMOL/L (ref 7–16)
AST SERPL-CCNC: 12 U/L (ref 15–37)
BASOPHILS # BLD: 0 K/UL (ref 0–0.2)
BASOPHILS NFR BLD: 1 % (ref 0–2)
BILIRUB SERPL-MCNC: 0.5 MG/DL (ref 0.2–1.1)
BUN SERPL-MCNC: 11 MG/DL (ref 6–23)
CALCIUM SERPL-MCNC: 9.7 MG/DL (ref 8.3–10.4)
CHLORIDE SERPL-SCNC: 111 MMOL/L (ref 98–107)
CO2 SERPL-SCNC: 21 MMOL/L (ref 21–32)
CREAT SERPL-MCNC: 1.46 MG/DL (ref 0.8–1.5)
DIFFERENTIAL METHOD BLD: ABNORMAL
EOSINOPHIL # BLD: 0 K/UL (ref 0–0.8)
EOSINOPHIL NFR BLD: 1 % (ref 0.5–7.8)
ERYTHROCYTE [DISTWIDTH] IN BLOOD BY AUTOMATED COUNT: 16.6 % (ref 11.9–14.6)
GLOBULIN SER CALC-MCNC: 3.4 G/DL (ref 2.3–3.5)
GLUCOSE SERPL-MCNC: 151 MG/DL (ref 65–100)
HCT VFR BLD AUTO: 34 % (ref 41.1–50.3)
HGB BLD-MCNC: 11 G/DL (ref 13.6–17.2)
IMM GRANULOCYTES # BLD AUTO: 0 K/UL (ref 0–0.5)
IMM GRANULOCYTES NFR BLD AUTO: 0 % (ref 0–5)
LYMPHOCYTES # BLD: 0.5 K/UL (ref 0.5–4.6)
LYMPHOCYTES NFR BLD: 12 % (ref 13–44)
MAGNESIUM SERPL-MCNC: 2 MG/DL (ref 1.8–2.4)
MCH RBC QN AUTO: 27.8 PG (ref 26.1–32.9)
MCHC RBC AUTO-ENTMCNC: 32.4 G/DL (ref 31.4–35)
MCV RBC AUTO: 86.1 FL (ref 79.6–97.8)
MONOCYTES # BLD: 0.4 K/UL (ref 0.1–1.3)
MONOCYTES NFR BLD: 11 % (ref 4–12)
NEUTS SEG # BLD: 3.1 K/UL (ref 1.7–8.2)
NEUTS SEG NFR BLD: 76 % (ref 43–78)
NRBC # BLD: 0 K/UL (ref 0–0.2)
PLATELET # BLD AUTO: 137 K/UL (ref 150–450)
PMV BLD AUTO: 10.3 FL (ref 9.4–12.3)
POTASSIUM SERPL-SCNC: 4.8 MMOL/L (ref 3.5–5.1)
PROT SERPL-MCNC: 7.3 G/DL (ref 6.3–8.2)
RBC # BLD AUTO: 3.95 M/UL (ref 4.23–5.67)
SODIUM SERPL-SCNC: 141 MMOL/L (ref 136–145)
WBC # BLD AUTO: 4.1 K/UL (ref 4.3–11.1)

## 2019-09-24 PROCEDURE — 36415 COLL VENOUS BLD VENIPUNCTURE: CPT

## 2019-09-24 PROCEDURE — 85025 COMPLETE CBC W/AUTO DIFF WBC: CPT

## 2019-09-24 PROCEDURE — 80053 COMPREHEN METABOLIC PANEL: CPT

## 2019-09-24 PROCEDURE — 83735 ASSAY OF MAGNESIUM: CPT

## 2019-09-24 NOTE — PROGRESS NOTES
9/24/19 saw pt today with Dr. Marv Estrada for pre treatment cycle 2 lutathera. Infusion scheduled for Thursday. He is feeling well. Requesting 1/2 dose this time to help minimize side effects. Reported fatigue with last cycle. We will arrange next cycle in about 8 weeks. Will monitor sandostatin injections at [facility] to arrange. Encouraged to call with any concerns. Navigation will continue to follow.

## 2019-09-26 ENCOUNTER — HOSPITAL ENCOUNTER (OUTPATIENT)
Dept: INFUSION THERAPY | Age: 54
Discharge: HOME | End: 2019-09-26
Payer: MEDICARE

## 2019-09-26 ENCOUNTER — HOSPITAL ENCOUNTER (OUTPATIENT)
Dept: NUCLEAR MEDICINE | Age: 54
Discharge: HOME | End: 2019-09-26
Payer: MEDICARE

## 2019-09-26 VITALS
WEIGHT: 145.6 LBS | HEART RATE: 60 BPM | RESPIRATION RATE: 18 BRPM | TEMPERATURE: 98.4 F | BODY MASS INDEX: 22.14 KG/M2 | OXYGEN SATURATION: 100 % | DIASTOLIC BLOOD PRESSURE: 85 MMHG | SYSTOLIC BLOOD PRESSURE: 152 MMHG

## 2019-09-26 DIAGNOSIS — C7B.03 SECONDARY CARCINOID TUMORS OF BONE (HCC): ICD-10-CM

## 2019-09-26 DIAGNOSIS — C7A.8 PRIMARY MALIGNANT NEUROENDOCRINE TUMOR OF PANCREAS (HCC): Primary | ICD-10-CM

## 2019-09-26 DIAGNOSIS — C7B.02 SECONDARY CARCINOID TUMORS OF LIVER (HCC): ICD-10-CM

## 2019-09-26 DIAGNOSIS — C7A.8 PRIMARY MALIGNANT NEUROENDOCRINE TUMOR OF PANCREAS (HCC): ICD-10-CM

## 2019-09-26 PROCEDURE — 74011000258 HC RX REV CODE- 258: Performed by: INTERNAL MEDICINE

## 2019-09-26 PROCEDURE — 96365 THER/PROPH/DIAG IV INF INIT: CPT

## 2019-09-26 PROCEDURE — 96366 THER/PROPH/DIAG IV INF ADDON: CPT

## 2019-09-26 PROCEDURE — A9513 LUTETIUM LU 177 DOTATAT THER: HCPCS | Performed by: INTERNAL MEDICINE

## 2019-09-26 PROCEDURE — 96375 TX/PRO/DX INJ NEW DRUG ADDON: CPT

## 2019-09-26 PROCEDURE — 96361 HYDRATE IV INFUSION ADD-ON: CPT

## 2019-09-26 PROCEDURE — 74011250636 HC RX REV CODE- 250/636: Performed by: INTERNAL MEDICINE

## 2019-09-26 PROCEDURE — 79101 NUCLEAR RX IV ADMIN: CPT

## 2019-09-26 PROCEDURE — 74011000258 HC RX REV CODE- 258

## 2019-09-26 PROCEDURE — 74011000250 HC RX REV CODE- 250: Performed by: INTERNAL MEDICINE

## 2019-09-26 RX ORDER — SODIUM CHLORIDE 0.9 % (FLUSH) 0.9 %
10 SYRINGE (ML) INJECTION AS NEEDED
Status: ACTIVE | OUTPATIENT
Start: 2019-09-26 | End: 2019-09-26

## 2019-09-26 RX ORDER — SODIUM CHLORIDE 9 MG/ML
25 INJECTION, SOLUTION INTRAVENOUS CONTINUOUS
Status: ACTIVE | OUTPATIENT
Start: 2019-09-26 | End: 2019-09-26

## 2019-09-26 RX ORDER — PALONOSETRON 0.05 MG/ML
0.25 INJECTION, SOLUTION INTRAVENOUS ONCE
Status: COMPLETED | OUTPATIENT
Start: 2019-09-26 | End: 2019-09-26

## 2019-09-26 RX ORDER — DEXAMETHASONE SODIUM PHOSPHATE 100 MG/10ML
10 INJECTION INTRAMUSCULAR; INTRAVENOUS ONCE
Status: COMPLETED | OUTPATIENT
Start: 2019-09-26 | End: 2019-09-26

## 2019-09-26 RX ADMIN — Medication 10 ML: at 09:00

## 2019-09-26 RX ADMIN — PALONOSETRON 0.25 MG: 0.05 INJECTION, SOLUTION INTRAVENOUS at 09:15

## 2019-09-26 RX ADMIN — SODIUM CHLORIDE 25 ML/HR: 900 INJECTION, SOLUTION INTRAVENOUS at 09:02

## 2019-09-26 RX ADMIN — DEXAMETHASONE SODIUM PHOSPHATE 10 MG: 10 INJECTION INTRAMUSCULAR; INTRAVENOUS at 09:24

## 2019-09-26 RX ADMIN — Medication 10 ML: at 08:55

## 2019-09-26 RX ADMIN — ISOLEUCINE, LEUCINE, LYSINE ACETATE, METHIONINE, PHENYLALANINE, THREONINE, TRYPTOPHAN, VALINE, CYSTEINE HYDROCHLORIDE, HISTIDINE, TYROSINE, N-ACETYL-TYROSINE, ALANINE, ARGININE, PROLINE, SERINE, GLYCINE, ASPARTIC ACID, GLUTAMIC ACID, AND TAURINE 200 G
.82; 1.4; 1.2; .34; .48; .42; .2; .78; .024; .48; .044; .24; .54; 1.2; .68; .38; .36; .32; .5; .025 SOLUTION INTRAVENOUS at 11:04

## 2019-09-26 RX ADMIN — FAMOTIDINE 20 MG: 10 INJECTION, SOLUTION INTRAVENOUS at 09:19

## 2019-09-26 RX ADMIN — FOSAPREPITANT 150 MG: 150 INJECTION, POWDER, LYOPHILIZED, FOR SOLUTION INTRAVENOUS at 09:30

## 2019-09-26 RX ADMIN — LUTETIUM LU 177 DOTATATE 104 MILLICURIE: 10 INJECTION INTRAVENOUS at 11:35

## 2019-09-26 RX ADMIN — SODIUM CHLORIDE 1000 ML: 900 INJECTION, SOLUTION INTRAVENOUS at 09:02

## 2019-09-26 NOTE — PROGRESS NOTES
Patient arrived ambulatory for Lutathera. Patient identified using name and date of birth. Labs drawn on 9/24/19 and reviewed. ANC 3.1, platelet count 148, hemoglobin 11, Total bilirubin 0.5, and creatinine 1.46 Creatinine clearance greater than or less than 40% of baseline yes/no. Ok to proceed. Last sandostatin dose April at Dr Alejandrina Polanco office. Next appointment not scheduled for sandostatin. Currently on hold per referring physician. Breakthrough carcinoind symptoms:  Yes / No.  Allergies reviewed. IV's x 2 initiated. NS 1000 ml @ 500 mL/hr to left arm and 500 ml NS @ 25 mL/hr to right arm. Premeds given. Trophamine initiated @ 250 mL/hr @ 1104 & titrated. Increased to 300 mL/hr at 1134 and to 350 mL/hr  @ 1204 and 400 ml/hr at 1234. Trophamine completed @ 32 61 16. Any issues with Trophamine:  Yes/NoRosalio Antonio with nuclear med present for Lutathera infusion and completed @ 1203. Trophamine continued for a total of 1500 mL and 30 minutes pre and 3.5 hours post Lutathera. Room cleared by Karena Collins from [facility]. IV's removed intact x 2, sites clear. Discharge instructions reviewed with patient, verbal understanding obtained. Written instructions and wallet card given to patient. Patient discharged ambulatory in stable condition. Patient to notify physician with any issues.

## 2020-01-21 ENCOUNTER — PATIENT OUTREACH (OUTPATIENT)
Dept: CASE MANAGEMENT | Age: 55
End: 2020-01-21

## 2020-01-21 ENCOUNTER — HOSPITAL ENCOUNTER (OUTPATIENT)
Dept: LAB | Age: 55
Discharge: HOME | End: 2020-01-21
Payer: COMMERCIAL

## 2020-01-21 DIAGNOSIS — C7A.8 PRIMARY MALIGNANT NEUROENDOCRINE TUMOR OF PANCREAS (HCC): ICD-10-CM

## 2020-01-21 DIAGNOSIS — C7B.03 SECONDARY CARCINOID TUMORS OF BONE (HCC): ICD-10-CM

## 2020-01-21 DIAGNOSIS — C7B.02 SECONDARY CARCINOID TUMORS OF LIVER (HCC): ICD-10-CM

## 2020-01-21 DIAGNOSIS — Z01.812 PRE-PROCEDURE LAB EXAM: ICD-10-CM

## 2020-01-21 LAB
ALBUMIN SERPL-MCNC: 3.6 G/DL (ref 3.5–5)
ALBUMIN/GLOB SERPL: 1.2 {RATIO} (ref 1.2–3.5)
ALP SERPL-CCNC: 152 U/L (ref 50–136)
ALT SERPL-CCNC: 27 U/L (ref 12–65)
ANION GAP SERPL CALC-SCNC: 5 MMOL/L (ref 7–16)
AST SERPL-CCNC: 20 U/L (ref 15–37)
BASOPHILS # BLD: 0 K/UL (ref 0–0.2)
BASOPHILS NFR BLD: 0 % (ref 0–2)
BILIRUB SERPL-MCNC: 0.4 MG/DL (ref 0.2–1.1)
BUN SERPL-MCNC: 7 MG/DL (ref 6–23)
CALCIUM SERPL-MCNC: 8.7 MG/DL (ref 8.3–10.4)
CHLORIDE SERPL-SCNC: 108 MMOL/L (ref 98–107)
CO2 SERPL-SCNC: 26 MMOL/L (ref 21–32)
CREAT SERPL-MCNC: 1.49 MG/DL (ref 0.8–1.5)
DIFFERENTIAL METHOD BLD: ABNORMAL
EOSINOPHIL # BLD: 0 K/UL (ref 0–0.8)
EOSINOPHIL NFR BLD: 1 % (ref 0.5–7.8)
ERYTHROCYTE [DISTWIDTH] IN BLOOD BY AUTOMATED COUNT: 13.6 % (ref 11.9–14.6)
GLOBULIN SER CALC-MCNC: 3 G/DL (ref 2.3–3.5)
GLUCOSE SERPL-MCNC: 204 MG/DL (ref 65–100)
HCT VFR BLD AUTO: 30.4 % (ref 41.1–50.3)
HGB BLD-MCNC: 9.9 G/DL (ref 13.6–17.2)
IMM GRANULOCYTES # BLD AUTO: 0 K/UL (ref 0–0.5)
IMM GRANULOCYTES NFR BLD AUTO: 0 % (ref 0–5)
LYMPHOCYTES # BLD: 0.5 K/UL (ref 0.5–4.6)
LYMPHOCYTES NFR BLD: 16 % (ref 13–44)
MAGNESIUM SERPL-MCNC: 2 MG/DL (ref 1.8–2.4)
MCH RBC QN AUTO: 28.4 PG (ref 26.1–32.9)
MCHC RBC AUTO-ENTMCNC: 32.6 G/DL (ref 31.4–35)
MCV RBC AUTO: 87.4 FL (ref 79.6–97.8)
MONOCYTES # BLD: 0.3 K/UL (ref 0.1–1.3)
MONOCYTES NFR BLD: 9 % (ref 4–12)
NEUTS SEG # BLD: 2.2 K/UL (ref 1.7–8.2)
NEUTS SEG NFR BLD: 73 % (ref 43–78)
NRBC # BLD: 0 K/UL (ref 0–0.2)
PLATELET # BLD AUTO: 95 K/UL (ref 150–450)
PMV BLD AUTO: 11.3 FL (ref 9.4–12.3)
POTASSIUM SERPL-SCNC: 4.5 MMOL/L (ref 3.5–5.1)
PROT SERPL-MCNC: 6.6 G/DL (ref 6.3–8.2)
RBC # BLD AUTO: 3.48 M/UL (ref 4.23–5.67)
SODIUM SERPL-SCNC: 139 MMOL/L (ref 136–145)
WBC # BLD AUTO: 3 K/UL (ref 4.3–11.1)

## 2020-01-21 PROCEDURE — 36415 COLL VENOUS BLD VENIPUNCTURE: CPT

## 2020-01-21 PROCEDURE — 80053 COMPREHEN METABOLIC PANEL: CPT

## 2020-01-21 PROCEDURE — 83735 ASSAY OF MAGNESIUM: CPT

## 2020-01-21 PROCEDURE — 85025 COMPLETE CBC W/AUTO DIFF WBC: CPT

## 2020-01-24 ENCOUNTER — APPOINTMENT (OUTPATIENT)
Dept: INFUSION THERAPY | Age: 55
End: 2020-01-24

## 2020-03-10 ENCOUNTER — HOSPITAL ENCOUNTER (OUTPATIENT)
Dept: LAB | Age: 55
Discharge: HOME | End: 2020-03-10
Payer: COMMERCIAL

## 2020-03-10 DIAGNOSIS — C7B.03 SECONDARY CARCINOID TUMORS OF BONE (HCC): ICD-10-CM

## 2020-03-10 DIAGNOSIS — Z01.812 PRE-PROCEDURE LAB EXAM: ICD-10-CM

## 2020-03-10 DIAGNOSIS — C7B.02 SECONDARY CARCINOID TUMORS OF LIVER (HCC): ICD-10-CM

## 2020-03-10 DIAGNOSIS — C7A.8 PRIMARY MALIGNANT NEUROENDOCRINE TUMOR OF PANCREAS (HCC): ICD-10-CM

## 2020-03-10 LAB
ALBUMIN SERPL-MCNC: 3 G/DL (ref 3.5–5)
ALBUMIN/GLOB SERPL: 0.8 {RATIO} (ref 1.2–3.5)
ALP SERPL-CCNC: 202 U/L (ref 50–136)
ALT SERPL-CCNC: 16 U/L (ref 12–65)
ANION GAP SERPL CALC-SCNC: 4 MMOL/L (ref 7–16)
AST SERPL-CCNC: 13 U/L (ref 15–37)
BASOPHILS # BLD: 0 K/UL (ref 0–0.2)
BASOPHILS NFR BLD: 0 % (ref 0–2)
BILIRUB SERPL-MCNC: 0.2 MG/DL (ref 0.2–1.1)
BUN SERPL-MCNC: 10 MG/DL (ref 6–23)
CALCIUM SERPL-MCNC: 8.6 MG/DL (ref 8.3–10.4)
CHLORIDE SERPL-SCNC: 107 MMOL/L (ref 98–107)
CO2 SERPL-SCNC: 29 MMOL/L (ref 21–32)
CREAT SERPL-MCNC: 1.33 MG/DL (ref 0.8–1.5)
DIFFERENTIAL METHOD BLD: ABNORMAL
EOSINOPHIL # BLD: 0 K/UL (ref 0–0.8)
EOSINOPHIL NFR BLD: 1 % (ref 0.5–7.8)
ERYTHROCYTE [DISTWIDTH] IN BLOOD BY AUTOMATED COUNT: 14.3 % (ref 11.9–14.6)
GLOBULIN SER CALC-MCNC: 3.8 G/DL (ref 2.3–3.5)
GLUCOSE SERPL-MCNC: 126 MG/DL (ref 65–100)
HCT VFR BLD AUTO: 29.4 % (ref 41.1–50.3)
HGB BLD-MCNC: 9.3 G/DL (ref 13.6–17.2)
IMM GRANULOCYTES # BLD AUTO: 0 K/UL (ref 0–0.5)
IMM GRANULOCYTES NFR BLD AUTO: 1 % (ref 0–5)
LYMPHOCYTES # BLD: 0.4 K/UL (ref 0.5–4.6)
LYMPHOCYTES NFR BLD: 10 % (ref 13–44)
MAGNESIUM SERPL-MCNC: 2.1 MG/DL (ref 1.8–2.4)
MCH RBC QN AUTO: 26.6 PG (ref 26.1–32.9)
MCHC RBC AUTO-ENTMCNC: 31.6 G/DL (ref 31.4–35)
MCV RBC AUTO: 84.2 FL (ref 79.6–97.8)
MONOCYTES # BLD: 0.4 K/UL (ref 0.1–1.3)
MONOCYTES NFR BLD: 11 % (ref 4–12)
NEUTS SEG # BLD: 2.8 K/UL (ref 1.7–8.2)
NEUTS SEG NFR BLD: 78 % (ref 43–78)
NRBC # BLD: 0 K/UL (ref 0–0.2)
PLATELET # BLD AUTO: 125 K/UL (ref 150–450)
PMV BLD AUTO: 10.5 FL (ref 9.4–12.3)
POTASSIUM SERPL-SCNC: 4.5 MMOL/L (ref 3.5–5.1)
PROT SERPL-MCNC: 6.8 G/DL (ref 6.3–8.2)
RBC # BLD AUTO: 3.49 M/UL (ref 4.23–5.67)
SODIUM SERPL-SCNC: 140 MMOL/L (ref 136–145)
WBC # BLD AUTO: 3.6 K/UL (ref 4.3–11.1)

## 2020-03-10 PROCEDURE — 36415 COLL VENOUS BLD VENIPUNCTURE: CPT

## 2020-03-10 PROCEDURE — 80053 COMPREHEN METABOLIC PANEL: CPT

## 2020-03-10 PROCEDURE — 85025 COMPLETE CBC W/AUTO DIFF WBC: CPT

## 2020-03-10 PROCEDURE — 83735 ASSAY OF MAGNESIUM: CPT

## 2020-03-10 RX ORDER — ACETAMINOPHEN 325 MG/1
650 TABLET ORAL AS NEEDED
Status: CANCELLED
Start: 2020-03-13

## 2020-03-10 RX ORDER — SODIUM CHLORIDE 9 MG/ML
10 INJECTION INTRAMUSCULAR; INTRAVENOUS; SUBCUTANEOUS AS NEEDED
Status: CANCELLED | OUTPATIENT
Start: 2020-03-13

## 2020-03-10 RX ORDER — PALONOSETRON 0.05 MG/ML
0.25 INJECTION, SOLUTION INTRAVENOUS ONCE
Status: CANCELLED | OUTPATIENT
Start: 2020-03-13

## 2020-03-10 RX ORDER — ALBUTEROL SULFATE 0.83 MG/ML
2.5 SOLUTION RESPIRATORY (INHALATION) AS NEEDED
Status: CANCELLED
Start: 2020-03-13

## 2020-03-10 RX ORDER — MAG HYDROX/ALUMINUM HYD/SIMETH 200-200-20
30 SUSPENSION, ORAL (FINAL DOSE FORM) ORAL
Status: CANCELLED | OUTPATIENT
Start: 2020-03-13

## 2020-03-10 RX ORDER — SODIUM CHLORIDE 9 MG/ML
25 INJECTION, SOLUTION INTRAVENOUS CONTINUOUS
Status: CANCELLED | OUTPATIENT
Start: 2020-03-13

## 2020-03-10 RX ORDER — DIPHENHYDRAMINE HYDROCHLORIDE 50 MG/ML
50 INJECTION, SOLUTION INTRAMUSCULAR; INTRAVENOUS AS NEEDED
Status: CANCELLED
Start: 2020-03-13

## 2020-03-10 RX ORDER — HEPARIN 100 UNIT/ML
300-500 SYRINGE INTRAVENOUS AS NEEDED
Status: CANCELLED
Start: 2020-03-13

## 2020-03-10 RX ORDER — ONDANSETRON 2 MG/ML
8 INJECTION INTRAMUSCULAR; INTRAVENOUS AS NEEDED
Status: CANCELLED | OUTPATIENT
Start: 2020-03-13

## 2020-03-10 RX ORDER — HYDROCORTISONE SODIUM SUCCINATE 100 MG/2ML
100 INJECTION, POWDER, FOR SOLUTION INTRAMUSCULAR; INTRAVENOUS AS NEEDED
Status: CANCELLED | OUTPATIENT
Start: 2020-03-13

## 2020-03-10 RX ORDER — SODIUM CHLORIDE 0.9 % (FLUSH) 0.9 %
10 SYRINGE (ML) INJECTION AS NEEDED
Status: CANCELLED
Start: 2020-03-13

## 2020-03-10 RX ORDER — EPINEPHRINE 1 MG/ML
0.3 INJECTION, SOLUTION, CONCENTRATE INTRAVENOUS AS NEEDED
Status: CANCELLED | OUTPATIENT
Start: 2020-03-13

## 2020-03-10 RX ORDER — DEXAMETHASONE SODIUM PHOSPHATE 100 MG/10ML
10 INJECTION INTRAMUSCULAR; INTRAVENOUS ONCE
Status: CANCELLED | OUTPATIENT
Start: 2020-03-13

## 2020-03-13 ENCOUNTER — HOSPITAL ENCOUNTER (OUTPATIENT)
Dept: NUCLEAR MEDICINE | Age: 55
Discharge: HOME | End: 2020-03-13
Payer: COMMERCIAL

## 2020-03-13 ENCOUNTER — HOSPITAL ENCOUNTER (OUTPATIENT)
Dept: INFUSION THERAPY | Age: 55
Discharge: HOME | End: 2020-03-13
Payer: COMMERCIAL

## 2020-03-13 VITALS
HEART RATE: 53 BPM | DIASTOLIC BLOOD PRESSURE: 68 MMHG | WEIGHT: 156.8 LBS | BODY MASS INDEX: 23.67 KG/M2 | RESPIRATION RATE: 18 BRPM | TEMPERATURE: 98 F | OXYGEN SATURATION: 99 % | SYSTOLIC BLOOD PRESSURE: 149 MMHG

## 2020-03-13 DIAGNOSIS — C7B.03 SECONDARY CARCINOID TUMORS OF BONE (HCC): ICD-10-CM

## 2020-03-13 DIAGNOSIS — C7A.8 PRIMARY MALIGNANT NEUROENDOCRINE TUMOR OF PANCREAS (HCC): Primary | ICD-10-CM

## 2020-03-13 DIAGNOSIS — C7A.8 PRIMARY MALIGNANT NEUROENDOCRINE TUMOR OF PANCREAS (HCC): ICD-10-CM

## 2020-03-13 DIAGNOSIS — C7B.02 SECONDARY CARCINOID TUMORS OF LIVER (HCC): ICD-10-CM

## 2020-03-13 PROCEDURE — 96366 THER/PROPH/DIAG IV INF ADDON: CPT

## 2020-03-13 PROCEDURE — 79101 NUCLEAR RX IV ADMIN: CPT

## 2020-03-13 PROCEDURE — 74011250636 HC RX REV CODE- 250/636: Performed by: INTERNAL MEDICINE

## 2020-03-13 PROCEDURE — 96367 TX/PROPH/DG ADDL SEQ IV INF: CPT

## 2020-03-13 PROCEDURE — 96361 HYDRATE IV INFUSION ADD-ON: CPT

## 2020-03-13 PROCEDURE — 74011000258 HC RX REV CODE- 258: Performed by: INTERNAL MEDICINE

## 2020-03-13 PROCEDURE — 74011000250 HC RX REV CODE- 250: Performed by: INTERNAL MEDICINE

## 2020-03-13 PROCEDURE — 96365 THER/PROPH/DIAG IV INF INIT: CPT

## 2020-03-13 PROCEDURE — A9513 LUTETIUM LU 177 DOTATAT THER: HCPCS | Performed by: INTERNAL MEDICINE

## 2020-03-13 PROCEDURE — 96375 TX/PRO/DX INJ NEW DRUG ADDON: CPT

## 2020-03-13 RX ORDER — DEXAMETHASONE SODIUM PHOSPHATE 100 MG/10ML
10 INJECTION INTRAMUSCULAR; INTRAVENOUS ONCE
Status: COMPLETED | OUTPATIENT
Start: 2020-03-13 | End: 2020-03-13

## 2020-03-13 RX ORDER — SODIUM CHLORIDE 9 MG/ML
25 INJECTION, SOLUTION INTRAVENOUS CONTINUOUS
Status: ACTIVE | OUTPATIENT
Start: 2020-03-13 | End: 2020-03-13

## 2020-03-13 RX ORDER — PALONOSETRON 0.05 MG/ML
0.25 INJECTION, SOLUTION INTRAVENOUS ONCE
Status: COMPLETED | OUTPATIENT
Start: 2020-03-13 | End: 2020-03-13

## 2020-03-13 RX ADMIN — DEXAMETHASONE SODIUM PHOSPHATE 10 MG: 10 INJECTION INTRAMUSCULAR; INTRAVENOUS at 09:32

## 2020-03-13 RX ADMIN — ISOLEUCINE, LEUCINE, LYSINE ACETATE, METHIONINE, PHENYLALANINE, THREONINE, TRYPTOPHAN, VALINE, CYSTEINE HYDROCHLORIDE, HISTIDINE, TYROSINE, N-ACETYL-TYROSINE, ALANINE, ARGININE, PROLINE, SERINE, GLYCINE, ASPARTIC ACID, GLUTAMIC ACID, AND TAURINE 200 G
.82; 1.4; 1.2; .34; .48; .42; .2; .78; .024; .48; .044; .24; .54; 1.2; .68; .38; .36; .32; .5; .025 SOLUTION INTRAVENOUS at 10:18

## 2020-03-13 RX ADMIN — SODIUM CHLORIDE 150 MG: 900 INJECTION, SOLUTION INTRAVENOUS at 09:48

## 2020-03-13 RX ADMIN — SODIUM CHLORIDE 25 ML/HR: 900 INJECTION, SOLUTION INTRAVENOUS at 09:28

## 2020-03-13 RX ADMIN — PALONOSETRON 0.25 MG: 0.05 INJECTION, SOLUTION INTRAVENOUS at 09:28

## 2020-03-13 RX ADMIN — SODIUM CHLORIDE 1000 ML: 900 INJECTION, SOLUTION INTRAVENOUS at 08:25

## 2020-03-13 RX ADMIN — LUTETIUM LU 177 DOTATATE 98.6 MILLICURIE: 10 INJECTION INTRAVENOUS at 11:00

## 2020-03-13 RX ADMIN — FAMOTIDINE 20 MG: 10 INJECTION, SOLUTION INTRAVENOUS at 09:30

## 2020-03-13 NOTE — PROGRESS NOTES
[facility] NS bolus, anti emetics and Trophamine completed. Lutathera administered by Yovani Hernandez [facility] Patient tolerated without problems. Room scanned and cleared by Yovani Hernandez with Nuc Med. Elvi Hoffman RN spoke with patient and his wife, gave discharge information and Radiation information card  Any issues or concerns during appointment: no.  Patient aware of next follow up with MD  Discharged with spouse.